# Patient Record
Sex: FEMALE | Race: WHITE | NOT HISPANIC OR LATINO | URBAN - METROPOLITAN AREA
[De-identification: names, ages, dates, MRNs, and addresses within clinical notes are randomized per-mention and may not be internally consistent; named-entity substitution may affect disease eponyms.]

---

## 2023-08-11 ENCOUNTER — INPATIENT (INPATIENT)
Facility: HOSPITAL | Age: 78
LOS: 1 days | Discharge: HOME | DRG: 87 | End: 2023-08-13
Attending: STUDENT IN AN ORGANIZED HEALTH CARE EDUCATION/TRAINING PROGRAM | Admitting: HOSPITALIST
Payer: MEDICARE

## 2023-08-11 ENCOUNTER — EMERGENCY (EMERGENCY)
Facility: HOSPITAL | Age: 78
LOS: 1 days | Discharge: ANOTHER HOSPITAL | End: 2023-08-11
Attending: EMERGENCY MEDICINE | Admitting: EMERGENCY MEDICINE
Payer: MEDICARE

## 2023-08-11 ENCOUNTER — EMERGENCY (EMERGENCY)
Facility: HOSPITAL | Age: 78
LOS: 1 days | Discharge: HOME | End: 2023-08-11
Attending: STUDENT IN AN ORGANIZED HEALTH CARE EDUCATION/TRAINING PROGRAM | Admitting: STUDENT IN AN ORGANIZED HEALTH CARE EDUCATION/TRAINING PROGRAM
Payer: MEDICARE

## 2023-08-11 VITALS
SYSTOLIC BLOOD PRESSURE: 184 MMHG | HEART RATE: 72 BPM | RESPIRATION RATE: 18 BRPM | TEMPERATURE: 98 F | DIASTOLIC BLOOD PRESSURE: 80 MMHG | OXYGEN SATURATION: 97 %

## 2023-08-11 VITALS
TEMPERATURE: 99 F | DIASTOLIC BLOOD PRESSURE: 45 MMHG | SYSTOLIC BLOOD PRESSURE: 121 MMHG | HEART RATE: 87 BPM | HEIGHT: 59 IN | RESPIRATION RATE: 16 BRPM | OXYGEN SATURATION: 95 %

## 2023-08-11 VITALS
WEIGHT: 104.94 LBS | OXYGEN SATURATION: 98 % | DIASTOLIC BLOOD PRESSURE: 84 MMHG | RESPIRATION RATE: 20 BRPM | SYSTOLIC BLOOD PRESSURE: 198 MMHG | HEART RATE: 61 BPM | HEIGHT: 59 IN | TEMPERATURE: 98 F

## 2023-08-11 VITALS
TEMPERATURE: 98 F | HEIGHT: 59 IN | WEIGHT: 104.94 LBS | DIASTOLIC BLOOD PRESSURE: 90 MMHG | RESPIRATION RATE: 20 BRPM | HEART RATE: 113 BPM | SYSTOLIC BLOOD PRESSURE: 201 MMHG | OXYGEN SATURATION: 94 %

## 2023-08-11 VITALS
RESPIRATION RATE: 18 BRPM | SYSTOLIC BLOOD PRESSURE: 149 MMHG | HEART RATE: 67 BPM | OXYGEN SATURATION: 95 % | DIASTOLIC BLOOD PRESSURE: 67 MMHG

## 2023-08-11 DIAGNOSIS — I62.00 NONTRAUMATIC SUBDURAL HEMORRHAGE, UNSPECIFIED: ICD-10-CM

## 2023-08-11 LAB
ALBUMIN SERPL ELPH-MCNC: 3.2 G/DL — LOW (ref 3.3–5)
ALBUMIN SERPL ELPH-MCNC: 3.5 G/DL — SIGNIFICANT CHANGE UP (ref 3.3–5)
ALP SERPL-CCNC: 85 U/L — SIGNIFICANT CHANGE UP (ref 40–120)
ALP SERPL-CCNC: 95 U/L — SIGNIFICANT CHANGE UP (ref 40–120)
ALT FLD-CCNC: 22 U/L — SIGNIFICANT CHANGE UP (ref 12–78)
ALT FLD-CCNC: 25 U/L — SIGNIFICANT CHANGE UP (ref 12–78)
ANION GAP SERPL CALC-SCNC: 6 MMOL/L — SIGNIFICANT CHANGE UP (ref 5–17)
ANION GAP SERPL CALC-SCNC: 8 MMOL/L — SIGNIFICANT CHANGE UP (ref 5–17)
APTT BLD: 27.1 SEC — SIGNIFICANT CHANGE UP (ref 24.5–35.6)
AST SERPL-CCNC: 27 U/L — SIGNIFICANT CHANGE UP (ref 15–37)
AST SERPL-CCNC: 33 U/L — SIGNIFICANT CHANGE UP (ref 15–37)
BASOPHILS # BLD AUTO: 0.01 K/UL — SIGNIFICANT CHANGE UP (ref 0–0.2)
BASOPHILS # BLD AUTO: 0.02 K/UL — SIGNIFICANT CHANGE UP (ref 0–0.2)
BASOPHILS NFR BLD AUTO: 0.2 % — SIGNIFICANT CHANGE UP (ref 0–2)
BASOPHILS NFR BLD AUTO: 0.4 % — SIGNIFICANT CHANGE UP (ref 0–2)
BILIRUB SERPL-MCNC: 0.3 MG/DL — SIGNIFICANT CHANGE UP (ref 0.2–1.2)
BILIRUB SERPL-MCNC: 0.4 MG/DL — SIGNIFICANT CHANGE UP (ref 0.2–1.2)
BUN SERPL-MCNC: 12 MG/DL — SIGNIFICANT CHANGE UP (ref 7–23)
BUN SERPL-MCNC: 14 MG/DL — SIGNIFICANT CHANGE UP (ref 7–23)
CALCIUM SERPL-MCNC: 8.6 MG/DL — SIGNIFICANT CHANGE UP (ref 8.5–10.1)
CALCIUM SERPL-MCNC: 9 MG/DL — SIGNIFICANT CHANGE UP (ref 8.5–10.1)
CHLORIDE SERPL-SCNC: 104 MMOL/L — SIGNIFICANT CHANGE UP (ref 96–108)
CHLORIDE SERPL-SCNC: 98 MMOL/L — SIGNIFICANT CHANGE UP (ref 96–108)
CK MB CFR SERPL CALC: 2.3 NG/ML — SIGNIFICANT CHANGE UP (ref 0–3.6)
CO2 SERPL-SCNC: 28 MMOL/L — SIGNIFICANT CHANGE UP (ref 22–31)
CO2 SERPL-SCNC: 29 MMOL/L — SIGNIFICANT CHANGE UP (ref 22–31)
CREAT SERPL-MCNC: 0.67 MG/DL — SIGNIFICANT CHANGE UP (ref 0.5–1.3)
CREAT SERPL-MCNC: 0.71 MG/DL — SIGNIFICANT CHANGE UP (ref 0.5–1.3)
EGFR: 86 ML/MIN/1.73M2 — SIGNIFICANT CHANGE UP
EGFR: 89 ML/MIN/1.73M2 — SIGNIFICANT CHANGE UP
EOSINOPHIL # BLD AUTO: 0.07 K/UL — SIGNIFICANT CHANGE UP (ref 0–0.5)
EOSINOPHIL # BLD AUTO: 0.09 K/UL — SIGNIFICANT CHANGE UP (ref 0–0.5)
EOSINOPHIL NFR BLD AUTO: 1.1 % — SIGNIFICANT CHANGE UP (ref 0–6)
EOSINOPHIL NFR BLD AUTO: 1.7 % — SIGNIFICANT CHANGE UP (ref 0–6)
GLUCOSE SERPL-MCNC: 101 MG/DL — HIGH (ref 70–99)
GLUCOSE SERPL-MCNC: 141 MG/DL — HIGH (ref 70–99)
HCT VFR BLD CALC: 44 % — SIGNIFICANT CHANGE UP (ref 34.5–45)
HCT VFR BLD CALC: 45.4 % — HIGH (ref 34.5–45)
HGB BLD-MCNC: 14.6 G/DL — SIGNIFICANT CHANGE UP (ref 11.5–15.5)
HGB BLD-MCNC: 15 G/DL — SIGNIFICANT CHANGE UP (ref 11.5–15.5)
IMM GRANULOCYTES NFR BLD AUTO: 0.2 % — SIGNIFICANT CHANGE UP (ref 0–0.9)
IMM GRANULOCYTES NFR BLD AUTO: 0.5 % — SIGNIFICANT CHANGE UP (ref 0–0.9)
INR BLD: 0.89 RATIO — SIGNIFICANT CHANGE UP (ref 0.85–1.18)
LYMPHOCYTES # BLD AUTO: 1.28 K/UL — SIGNIFICANT CHANGE UP (ref 1–3.3)
LYMPHOCYTES # BLD AUTO: 1.29 K/UL — SIGNIFICANT CHANGE UP (ref 1–3.3)
LYMPHOCYTES # BLD AUTO: 20.8 % — SIGNIFICANT CHANGE UP (ref 13–44)
LYMPHOCYTES # BLD AUTO: 25 % — SIGNIFICANT CHANGE UP (ref 13–44)
MAGNESIUM SERPL-MCNC: 1.7 MG/DL — SIGNIFICANT CHANGE UP (ref 1.6–2.6)
MCHC RBC-ENTMCNC: 31.9 PG — SIGNIFICANT CHANGE UP (ref 27–34)
MCHC RBC-ENTMCNC: 32.3 PG — SIGNIFICANT CHANGE UP (ref 27–34)
MCHC RBC-ENTMCNC: 33 GM/DL — SIGNIFICANT CHANGE UP (ref 32–36)
MCHC RBC-ENTMCNC: 33.2 GM/DL — SIGNIFICANT CHANGE UP (ref 32–36)
MCV RBC AUTO: 96.6 FL — SIGNIFICANT CHANGE UP (ref 80–100)
MCV RBC AUTO: 97.3 FL — SIGNIFICANT CHANGE UP (ref 80–100)
MONOCYTES # BLD AUTO: 0.35 K/UL — SIGNIFICANT CHANGE UP (ref 0–0.9)
MONOCYTES # BLD AUTO: 0.38 K/UL — SIGNIFICANT CHANGE UP (ref 0–0.9)
MONOCYTES NFR BLD AUTO: 6.2 % — SIGNIFICANT CHANGE UP (ref 2–14)
MONOCYTES NFR BLD AUTO: 6.8 % — SIGNIFICANT CHANGE UP (ref 2–14)
NEUTROPHILS # BLD AUTO: 3.41 K/UL — SIGNIFICANT CHANGE UP (ref 1.8–7.4)
NEUTROPHILS # BLD AUTO: 4.39 K/UL — SIGNIFICANT CHANGE UP (ref 1.8–7.4)
NEUTROPHILS NFR BLD AUTO: 65.9 % — SIGNIFICANT CHANGE UP (ref 43–77)
NEUTROPHILS NFR BLD AUTO: 71.2 % — SIGNIFICANT CHANGE UP (ref 43–77)
NRBC # BLD: 0 /100 WBCS — SIGNIFICANT CHANGE UP (ref 0–0)
NRBC # BLD: 0 /100 WBCS — SIGNIFICANT CHANGE UP (ref 0–0)
PLATELET # BLD AUTO: 290 K/UL — SIGNIFICANT CHANGE UP (ref 150–400)
PLATELET # BLD AUTO: 330 K/UL — SIGNIFICANT CHANGE UP (ref 150–400)
POTASSIUM SERPL-MCNC: 3.7 MMOL/L — SIGNIFICANT CHANGE UP (ref 3.5–5.3)
POTASSIUM SERPL-MCNC: 4 MMOL/L — SIGNIFICANT CHANGE UP (ref 3.5–5.3)
POTASSIUM SERPL-SCNC: 3.7 MMOL/L — SIGNIFICANT CHANGE UP (ref 3.5–5.3)
POTASSIUM SERPL-SCNC: 4 MMOL/L — SIGNIFICANT CHANGE UP (ref 3.5–5.3)
PROT SERPL-MCNC: 7.2 G/DL — SIGNIFICANT CHANGE UP (ref 6–8.3)
PROT SERPL-MCNC: 7.7 G/DL — SIGNIFICANT CHANGE UP (ref 6–8.3)
PROTHROM AB SERPL-ACNC: 10.5 SEC — SIGNIFICANT CHANGE UP (ref 9.5–13)
RBC # BLD: 4.52 M/UL — SIGNIFICANT CHANGE UP (ref 3.8–5.2)
RBC # BLD: 4.7 M/UL — SIGNIFICANT CHANGE UP (ref 3.8–5.2)
RBC # FLD: 11.9 % — SIGNIFICANT CHANGE UP (ref 10.3–14.5)
RBC # FLD: 11.9 % — SIGNIFICANT CHANGE UP (ref 10.3–14.5)
SODIUM SERPL-SCNC: 134 MMOL/L — LOW (ref 135–145)
SODIUM SERPL-SCNC: 139 MMOL/L — SIGNIFICANT CHANGE UP (ref 135–145)
TROPONIN I, HIGH SENSITIVITY RESULT: 5.9 NG/L — SIGNIFICANT CHANGE UP
TROPONIN I, HIGH SENSITIVITY RESULT: 6.1 NG/L — SIGNIFICANT CHANGE UP
TROPONIN I, HIGH SENSITIVITY RESULT: 6.8 NG/L — SIGNIFICANT CHANGE UP
WBC # BLD: 5.17 K/UL — SIGNIFICANT CHANGE UP (ref 3.8–10.5)
WBC # BLD: 6.16 K/UL — SIGNIFICANT CHANGE UP (ref 3.8–10.5)
WBC # FLD AUTO: 5.17 K/UL — SIGNIFICANT CHANGE UP (ref 3.8–10.5)
WBC # FLD AUTO: 6.16 K/UL — SIGNIFICANT CHANGE UP (ref 3.8–10.5)

## 2023-08-11 PROCEDURE — 93306 TTE W/DOPPLER COMPLETE: CPT | Mod: 26

## 2023-08-11 PROCEDURE — 71046 X-RAY EXAM CHEST 2 VIEWS: CPT | Mod: 26

## 2023-08-11 PROCEDURE — 99285 EMERGENCY DEPT VISIT HI MDM: CPT

## 2023-08-11 PROCEDURE — 85025 COMPLETE CBC W/AUTO DIFF WBC: CPT

## 2023-08-11 PROCEDURE — 99291 CRITICAL CARE FIRST HOUR: CPT

## 2023-08-11 PROCEDURE — 99223 1ST HOSP IP/OBS HIGH 75: CPT

## 2023-08-11 PROCEDURE — 93005 ELECTROCARDIOGRAM TRACING: CPT

## 2023-08-11 PROCEDURE — 96365 THER/PROPH/DIAG IV INF INIT: CPT | Mod: XU

## 2023-08-11 PROCEDURE — 93010 ELECTROCARDIOGRAM REPORT: CPT | Mod: 77

## 2023-08-11 PROCEDURE — 93010 ELECTROCARDIOGRAM REPORT: CPT

## 2023-08-11 PROCEDURE — 85730 THROMBOPLASTIN TIME PARTIAL: CPT

## 2023-08-11 PROCEDURE — 36415 COLL VENOUS BLD VENIPUNCTURE: CPT

## 2023-08-11 PROCEDURE — 82962 GLUCOSE BLOOD TEST: CPT

## 2023-08-11 PROCEDURE — 70450 CT HEAD/BRAIN W/O DYE: CPT | Mod: 26,59,77,MA

## 2023-08-11 PROCEDURE — 70496 CT ANGIOGRAPHY HEAD: CPT | Mod: MA

## 2023-08-11 PROCEDURE — 99285 EMERGENCY DEPT VISIT HI MDM: CPT | Mod: 25

## 2023-08-11 PROCEDURE — 84484 ASSAY OF TROPONIN QUANT: CPT

## 2023-08-11 PROCEDURE — 70450 CT HEAD/BRAIN W/O DYE: CPT | Mod: 26,MA

## 2023-08-11 PROCEDURE — 80053 COMPREHEN METABOLIC PANEL: CPT

## 2023-08-11 PROCEDURE — 96375 TX/PRO/DX INJ NEW DRUG ADDON: CPT | Mod: XU

## 2023-08-11 PROCEDURE — 70450 CT HEAD/BRAIN W/O DYE: CPT | Mod: XU,MA

## 2023-08-11 PROCEDURE — 85610 PROTHROMBIN TIME: CPT

## 2023-08-11 PROCEDURE — 82553 CREATINE MB FRACTION: CPT

## 2023-08-11 PROCEDURE — 99222 1ST HOSP IP/OBS MODERATE 55: CPT

## 2023-08-11 PROCEDURE — 70498 CT ANGIOGRAPHY NECK: CPT | Mod: MA

## 2023-08-11 PROCEDURE — 70498 CT ANGIOGRAPHY NECK: CPT | Mod: 26,MA

## 2023-08-11 PROCEDURE — 83735 ASSAY OF MAGNESIUM: CPT

## 2023-08-11 PROCEDURE — 70496 CT ANGIOGRAPHY HEAD: CPT | Mod: 26,MA

## 2023-08-11 RX ORDER — ACETAMINOPHEN 500 MG
650 TABLET ORAL ONCE
Refills: 0 | Status: COMPLETED | OUTPATIENT
Start: 2023-08-11 | End: 2023-08-11

## 2023-08-11 RX ORDER — CHLORTHALIDONE 50 MG
1 TABLET ORAL
Qty: 30 | Refills: 0
Start: 2023-08-11 | End: 2023-09-09

## 2023-08-11 RX ORDER — LEVETIRACETAM 250 MG/1
500 TABLET, FILM COATED ORAL ONCE
Refills: 0 | Status: COMPLETED | OUTPATIENT
Start: 2023-08-11 | End: 2023-08-11

## 2023-08-11 RX ORDER — LISINOPRIL 2.5 MG/1
40 TABLET ORAL DAILY
Refills: 0 | Status: DISCONTINUED | OUTPATIENT
Start: 2023-08-12 | End: 2023-08-14

## 2023-08-11 RX ORDER — LABETALOL HCL 100 MG
10 TABLET ORAL
Refills: 0 | Status: DISCONTINUED | OUTPATIENT
Start: 2023-08-11 | End: 2023-08-15

## 2023-08-11 RX ORDER — NICARDIPINE HYDROCHLORIDE 30 MG/1
5 CAPSULE, EXTENDED RELEASE ORAL
Qty: 40 | Refills: 0 | Status: DISCONTINUED | OUTPATIENT
Start: 2023-08-11 | End: 2023-08-15

## 2023-08-11 RX ORDER — LISINOPRIL 2.5 MG/1
20 TABLET ORAL DAILY
Refills: 0 | Status: DISCONTINUED | OUTPATIENT
Start: 2023-08-11 | End: 2023-08-11

## 2023-08-11 RX ORDER — LISINOPRIL 2.5 MG/1
1 TABLET ORAL
Qty: 30 | Refills: 0
Start: 2023-08-11 | End: 2023-09-09

## 2023-08-11 RX ADMIN — Medication 650 MILLIGRAM(S): at 20:47

## 2023-08-11 RX ADMIN — LISINOPRIL 20 MILLIGRAM(S): 2.5 TABLET ORAL at 12:30

## 2023-08-11 RX ADMIN — LEVETIRACETAM 500 MILLIGRAM(S): 250 TABLET, FILM COATED ORAL at 20:31

## 2023-08-11 RX ADMIN — NICARDIPINE HYDROCHLORIDE 25 MG/HR: 30 CAPSULE, EXTENDED RELEASE ORAL at 20:47

## 2023-08-11 RX ADMIN — LEVETIRACETAM 420 MILLIGRAM(S): 250 TABLET, FILM COATED ORAL at 20:15

## 2023-08-11 NOTE — ED ADULT NURSE NOTE - CHIEF COMPLAINT QUOTE
transfer from [facility] on cardene 2.5mg/min, Alert and oriented to person, place, and time, given keppra in route, given 650mg po tylenol prior to leaving, was syncope and collapse

## 2023-08-11 NOTE — CONSULT NOTE ADULT - SUBJECTIVE AND OBJECTIVE BOX
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 Pt was seen earlier today at [facility] hospital for elevated BP and her BP medications were adjusted. Per family, they then went to the pharmacy picked up the medication and then went home. The patient was then talking on the phone with a friend when she became unresponsive and her daughter heard a loud thud and found the patient on the ground. Patient had LOC for about 30 seconds per family. Pt is amnesic to events immediately before and following this incident. Reports mild headache but denies any dizziness, vision changes, N/V. Was transferred to [facility] for further neurosurgical eval.     PAST MEDICAL & SURGICAL HISTORY:  HTN (hypertension)    HLD      FAMILY HISTORY:      SOCIAL HISTORY:  Tobacco Use: Denies  EtOH use: 1 glass of wine a day  Substance: Denies    Allergies    No Known Allergies    Intolerances        REVIEW OF SYSTEMS  Negative except as noted in HPI      HOME MEDICATIONS:  Home Medications:  Atorvastatin  Lisinopril    MEDICATIONS:  Antibiotics:    Neuro:    Anticoagulation:    OTHER:    IVF:      Vital Signs Last 24 Hrs  T(C): 37.3 (11 Aug 2023 21:30), Max: 37.3 (11 Aug 2023 21:30)  T(F): 99.1 (11 Aug 2023 21:30), Max: 99.1 (11 Aug 2023 21:30)  HR: 79 (11 Aug 2023 21:41) (61 - 113)  BP: 151/66 (11 Aug 2023 21:41) (121/45 - 201/90)  BP(mean): 95 (11 Aug 2023 21:41) (95 - 95)  RR: 16 (11 Aug 2023 21:41) (16 - 20)  SpO2: 96% (11 Aug 2023 21:41) (94% - 98%)    Parameters below as of 11 Aug 2023 21:41  Patient On (Oxygen Delivery Method): room air          PHYSICAL EXAM:  GENERAL: NAD, well-groomed  HEAD:  Atraumatic, normocephalic  EYES: Conjunctiva and sclera clear  ENMT: Good dentition  NECK: Supple, nontender to palpation  BLANCA COMA SCORE: E- V- M- = 15       E: 4= opens eyes spontaneously 3= to voice 2= to noxious 1= no opening       V: 5= oriented 4= confused 3= inappropriate words 2= incomprehensible sounds 1= nonverbal 1T= intubated       M: 6= follows commands 5= localizes 4= withdraws 3= flexor posturing 2= extensor posturing 1= no movement  MENTAL STATUS: AAO x3; Awake; Opens eyes spontaneously; Appropriately conversant without aphasia; following simple commands; amnesic to events before and immediately after syncope  CRANIAL NERVES: Visual acuity normal for age, visual fields full to confrontation, PERRL. EOMI without nystagmus. Facial sensation intact V1-3 distribution b/l. Face symmetric w/ normal eye closure and smile, tongue does deviate to L (pt unsure if this is baseline). Hearing grossly intact. Speech clear. Head turning and shoulder shrug intact.   REFLEXES: PERRL.   MOTOR: strength 5/5 b/l upper and lower extremities  SENSATION: grossly intact to light touch all extremities  COORDINATION: Gait testing deferred; no upper extremity dysmetria  CHEST/LUNG: Nonlabored on room air  SKIN: Warm, dry    LABS:                        15.0   6.16  )-----------( 330      ( 11 Aug 2023 16:45 )             45.4     08-11    134<L>  |  98  |  12  ----------------------------<  141<H>  3.7   |  28  |  0.71    Ca    8.6      11 Aug 2023 16:45  Mg     1.7     08-11    TPro  7.7  /  Alb  3.5  /  TBili  0.3  /  DBili  x   /  AST  33  /  ALT  25  /  AlkPhos  95  08-11    PT/INR - ( 11 Aug 2023 16:45 )   PT: 10.5 sec;   INR: 0.89 ratio         PTT - ( 11 Aug 2023 16:45 )  PTT:27.1 sec  Urinalysis Basic - ( 11 Aug 2023 16:45 )    Color: x / Appearance: x / SG: x / pH: x  Gluc: 141 mg/dL / Ketone: x  / Bili: x / Urobili: x   Blood: x / Protein: x / Nitrite: x   Leuk Esterase: x / RBC: x / WBC x   Sq Epi: x / Non Sq Epi: x / Bacteria: x        RADIOLOGY & ADDITIONAL STUDIES:    ACC: [id number] EXAM: CT ANGIO BRAIN (W)  ACC: [id number] EXAM: CT ANGIO NECK (W)  ACC: [id number] EXAM: CT BRAIN     PROCEDURE DATE: 08/11/2023    IMPRESSION:    CT HEAD:  1. Thin subdural hematoma overlying the right temporal lobe measuring upwards of 0.4 cm in thickness.  2. Small areas of decreased attenuation in the deep and periventricular white matter, likely compatible with chronic small vessel disease.    CTA BRAIN:  2 to 3 mm outpouching oriented inferiorly from the supraclinoid segment of the left ICA. 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    CTA NECK:  1. No evidence of critical stenosis by NASCET criteria.  2. Significant degenerative change of the cervical spine resulting in narrowing of the cervical spinal canal. Recommend MRI of the cervical spine for further assessment.     HISTORY OF PRESENT ILLNESS:   77yF PMHx of HTN, HLD, and monoclonal protein that could be a precursor from multiple myeloma monitored by hematologist for a monoclonal protein presents as transfer from [facility] s/p unwitnessed syncopal fall w/ CTH showing small R temporal SDH and CTA head showing incidental 2 to 3 mm outpouching of L ICA. Pt was seen earlier today at [facility] hospital for elevated BP and her BP medications were adjusted. Per family, they then went to the pharmacy picked up the medication and then went home. The patient was then talking on the phone with a friend when she became unresponsive and her daughter heard a loud thud and found the patient on the ground. Patient had LOC for about 30 seconds per family. Pt is amnesic to events immediately before and following this incident. Reports mild headache but denies any dizziness, vision changes, N/V. Denies any AC/AP use. Was transferred to [facility] for further neurosurgical eval.     PAST MEDICAL & SURGICAL HISTORY:  HTN (hypertension)    HLD      FAMILY HISTORY:      SOCIAL HISTORY:  Tobacco Use: Denies  EtOH use: 1 glass of wine a day  Substance: Denies    Allergies    No Known Allergies    Intolerances        REVIEW OF SYSTEMS  Negative except as noted in HPI      HOME MEDICATIONS:  Home Medications:  Atorvastatin  Lisinopril    MEDICATIONS:  Antibiotics:    Neuro:    Anticoagulation:    OTHER:    IVF:      Vital Signs Last 24 Hrs  T(C): 37.3 (11 Aug 2023 21:30), Max: 37.3 (11 Aug 2023 21:30)  T(F): 99.1 (11 Aug 2023 21:30), Max: 99.1 (11 Aug 2023 21:30)  HR: 79 (11 Aug 2023 21:41) (61 - 113)  BP: 151/66 (11 Aug 2023 21:41) (121/45 - 201/90)  BP(mean): 95 (11 Aug 2023 21:41) (95 - 95)  RR: 16 (11 Aug 2023 21:41) (16 - 20)  SpO2: 96% (11 Aug 2023 21:41) (94% - 98%)    Parameters below as of 11 Aug 2023 21:41  Patient On (Oxygen Delivery Method): room air          PHYSICAL EXAM:  GENERAL: NAD, well-groomed  HEAD:  Atraumatic, normocephalic  EYES: Conjunctiva and sclera clear  ENMT: Good dentition  NECK: Supple, nontender to palpation  BLANCA COMA SCORE: E- V- M- = 15       E: 4= opens eyes spontaneously 3= to voice 2= to noxious 1= no opening       V: 5= oriented 4= confused 3= inappropriate words 2= incomprehensible sounds 1= nonverbal 1T= intubated       M: 6= follows commands 5= localizes 4= withdraws 3= flexor posturing 2= extensor posturing 1= no movement  MENTAL STATUS: AAO x3; Awake; Opens eyes spontaneously; Appropriately conversant without aphasia; following simple commands; amnesic to events before and immediately after syncope  CRANIAL NERVES: Visual acuity normal for age, visual fields full to confrontation, PERRL. EOMI without nystagmus. Facial sensation intact V1-3 distribution b/l. Face symmetric w/ normal eye closure and smile, tongue does deviate to L (pt unsure if this is baseline). Hearing grossly intact. Speech clear. Head turning and shoulder shrug intact.   REFLEXES: PERRL.   MOTOR: strength 5/5 b/l upper and lower extremities  SENSATION: grossly intact to light touch all extremities  COORDINATION: Gait testing deferred; no upper extremity dysmetria  CHEST/LUNG: Nonlabored on room air  SKIN: Warm, dry    LABS:                        15.0   6.16  )-----------( 330      ( 11 Aug 2023 16:45 )             45.4     08-11    134<L>  |  98  |  12  ----------------------------<  141<H>  3.7   |  28  |  0.71    Ca    8.6      11 Aug 2023 16:45  Mg     1.7     08-11    TPro  7.7  /  Alb  3.5  /  TBili  0.3  /  DBili  x   /  AST  33  /  ALT  25  /  AlkPhos  95  08-11    PT/INR - ( 11 Aug 2023 16:45 )   PT: 10.5 sec;   INR: 0.89 ratio         PTT - ( 11 Aug 2023 16:45 )  PTT:27.1 sec  Urinalysis Basic - ( 11 Aug 2023 16:45 )    Color: x / Appearance: x / SG: x / pH: x  Gluc: 141 mg/dL / Ketone: x  / Bili: x / Urobili: x   Blood: x / Protein: x / Nitrite: x   Leuk Esterase: x / RBC: x / WBC x   Sq Epi: x / Non Sq Epi: x / Bacteria: x        RADIOLOGY & ADDITIONAL STUDIES:    ACC: [id number] EXAM: CT ANGIO BRAIN (W)  ACC: [id number] EXAM: CT ANGIO NECK (W)  ACC: [id number] EXAM: CT BRAIN     PROCEDURE DATE: 08/11/2023    IMPRESSION:    CT HEAD:  1. Thin subdural hematoma overlying the right temporal lobe measuring upwards of 0.4 cm in thickness.  2. Small areas of decreased attenuation in the deep and periventricular white matter, likely compatible with chronic small vessel disease.    CTA BRAIN:  2 to 3 mm outpouching oriented inferiorly from the supraclinoid segment of the left ICA. 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    CTA NECK:  1. No evidence of critical stenosis by NASCET criteria.  2. Significant degenerative change of the cervical spine resulting in narrowing of the cervical spinal canal. Recommend MRI of the cervical spine for further assessment.

## 2023-08-11 NOTE — ED ADULT NURSE NOTE - NSFALLUNIVINTERV_ED_ALL_ED
Bed/Stretcher in lowest position, wheels locked, appropriate side rails in place/Call bell, personal items and telephone in reach/Instruct patient to call for assistance before getting out of bed/chair/stretcher/Non-slip footwear applied when patient is off stretcher/[place] to call system/Physically safe environment - no spills, clutter or unnecessary equipment/Purposeful proactive rounding/Room/bathroom lighting operational, light cord in reach

## 2023-08-11 NOTE — ED ADULT NURSE NOTE - OBJECTIVE STATEMENT
pt tx from [facility] s/p syncopal episode. pmh HTN, HLD. pt states she woke up this morning and checked her BP prior to taking her medications and noted her BP was 200's. pt states she went to [facility], was prescribed lisinopril and diuretic and then was dc home. pt states she went home and was talking on the phone and had a unwitnessed syncopal episode. daughter found pt lying on the floor. pt denies any anticoagulants. pt  then took pt back to [facility] where she had a CT which demonstrated a ICH. pt received in CC area of ED, A+O x4. RR even and unlabored. NSR on CM. cardene infusing at 2.5mg/min. pt denies vision changes, N/V, dizziness. pt reports 6/10 headache, described as heaviness.

## 2023-08-11 NOTE — ED PROVIDER NOTE - CARE PROVIDER_API CALL
Tommy Smallwood  Cardiovascular Disease  [address] [place] [address]  Phone: [phone]  Fax: [phone]  Follow Up Time: 7-10 Days

## 2023-08-11 NOTE — H&P ADULT - NSHPPHYSICALEXAM_GEN_ALL_CORE
T(C): 36.8 (08-11-23 @ 23:58), Max: 37.3 (08-11-23 @ 21:30)  HR: 69 (08-11-23 @ 23:58) (61 - 113)  BP: 123/68 (08-11-23 @ 23:58) (121/45 - 201/90)  RR: 20 (08-11-23 @ 23:58) (16 - 21)  SpO2: 96% (08-11-23 @ 23:58) (94% - 98%)    GENERAL: patient appears well, no acute distress, appropriate, pleasant  EYES: sclera clear, no exudates  ENMT: oropharynx clear without erythema, no exudates, moist mucous membranes  NECK: supple, soft, no thyromegaly noted  LUNGS: good air entry bilaterally, clear to auscultation, symmetric breath sounds, no wheezing or rhonchi appreciated  HEART: soft S1/S2, regular rate and rhythm, no murmurs noted, no lower extremity edema  GASTROINTESTINAL: abdomen is soft, nontender, nondistended, normoactive bowel sounds, no palpable masses  INTEGUMENT: good skin turgor, warm skin, appears well perfused  MUSCULOSKELETAL: no clubbing or cyanosis, no obvious deformity  NEUROLOGIC: awake, alert, oriented x3, good muscle tone in 4 extremities, no obvious sensory deficits  PSYCHIATRIC: mood is good, affect is congruent, linear and logical thought process  HEME/LYMPH: no palpable supraclavicular nodules, no obvious ecchymosis or petechiae

## 2023-08-11 NOTE — ED ADULT NURSE NOTE - OBJECTIVE STATEMENT
Pt states that she took her BP at home today and the SBP was over 200. Pt noted noted to have a manual BP of 175/75. Pt was placed on cardiac monitor.  Pt states she developed a left sided discomfort today. Pt denies N,V, SOB, Blurred vision, double vision. Pt in no acute distress. Pt updated on plan of care.

## 2023-08-11 NOTE — H&P ADULT - ASSESSMENT
78 yo F PMHx HTN, HLD and monoclonal protein that could be precursor from multiple myeloma (monitored by hematology) who presented for syncope resulting in an unwitnessed fall.     #subdural hematoma  #syncope  #unwitnessed fall  #headache  -Repeat CT in process  -neurocheck q4h  -tylenol for pain  -order ECHO    #HTN  -hold home lisinopril and Chlorthalidone  -can restart if systolic BP > 160    #depression/anxiety  c/w escitalopram     DVT ppx: SCDs   76 yo F PMHx HTN, HLD and monoclonal protein that could be precursor from multiple myeloma (monitored by hematology) who presented for syncope resulting in an unwitnessed fall.     #subdural hematoma  #syncope  #unwitnessed fall  #headache  -Repeat CT showing increase in subdural hematoma  -neurochecks q2h (given increase in subdural hematoma)  -tylenol for pain  -follow up ECHO  -orthostats   -pt passed bedside swallow, diet ordered  -fall precautions  -telemetry monitoring      #HTN  restart lisinopril and hold chlorthalidone  goal BP systolic <160    #depression/anxiety  c/w escitalopram     DVT ppx: SCDs   76 yo F PMHx HTN, HLD and monoclonal protein that could be precursor from multiple myeloma (monitored by hematology) who presented for syncope resulting in an unwitnessed fall. Imaging showing subdural hematoma.     #subdural hematoma  #syncope  #unwitnessed fall  #headache  -Repeat CT showing increase in subdural hematoma  -neurosurgery consulted, recs appreciated  -neurochecks q2h (given increase in subdural hematoma size)  -tylenol for pain  -follow up ECHO  -orthostats   -pt passed bedside swallow, diet ordered  -fall precautions  -telemetry monitoring    #incidental 2-3 mm outpouching of L ICA  monitor in outpatient setting      #HTN  restart lisinopril and hold chlorthalidone  goal BP systolic <160    #depression/anxiety  c/w escitalopram     DVT ppx: SCDs

## 2023-08-11 NOTE — ED PROVIDER NOTE - OBJECTIVE STATEMENT
77-year-old female with a history of hypertension on lisinopril 20mg BID presents with hypertension.  Patient states recently her blood pressures have been going up and has been in the 180s range.  Patient saw her PCP 4 days ago Dr. Valdovinos and 12.5 mg of hydrochlorothiazide was added to her medication regimen.  Patient has been taking it but woke up this morning and her blood pressure was 205 systolic (prior to taking her medications ) and she was alarmed so she came to the ER.  Patient states she has also been having intermittent chest pain and left upper back pain which she mentioned to her dr, had EKG done that her doctor said was normal.  Patient was also told to follow-up with a cardiologist but has not made an appointment yet.  Patient states she does get Headaches at times at night and has intermittent chest pain as well has the episode today.  Patient was concerned because strokes run in her family and was concerned that if her blood pressure was high she would get a stroke.

## 2023-08-11 NOTE — ED PROVIDER NOTE - OBJECTIVE STATEMENT
The patient is a 77 year old female presents with syncope causing a small subdural bleed transferred from [facility]  The patient had a episode of HTN and went to [place] and was discharged home but the patient had syncopal episode  No CP, No SOB, No abd pain, No motor No sensory loss

## 2023-08-11 NOTE — ED PROVIDER NOTE - CLINICAL SUMMARY MEDICAL DECISION MAKING FREE TEXT BOX
77-year-old female with history of hypertension, was here and earlier today in the ER for elevated blood pressure, went home and now syncopized and does not recall any of the events, patient currently awake and alert offers no complaints no headache, no chest pain, no shortness of breath, will repeat EKG, send repeat labs, follow-up CT head and CT angio of head and neck, placed on cardiac monitor and admit. 77-year-old female with history of hypertension, was here and earlier today in the ER for elevated blood pressure, went home and now syncopized and does not recall any of the events, patient currently awake and alert offers no complaints no headache, no chest pain, no shortness of breath, will repeat EKG, send repeat labs, follow-up CT head and CT angio of head and neck, placed on cardiac monitor and admit.  -CT CTA with subdural and questionable saccular aneurysm.  Patient in ER nonfocal except for some amnesia of earlier part of the day but with hypertension.  Discussed with neurosurgery at transfer center for [place]: Treating the patient with Cardene drip Keppra and transfer tier 1 to [place] ER.

## 2023-08-11 NOTE — ED ADULT NURSE NOTE - PRIMARY CARE PROVIDER
PATIENT HAS BEEN CALLED, GIVEN MESSAGE AND STATED THAT SHE WOULD BE GOING TO THE ER.     PATIENT STATED THAT THE ER AT [facility] DID PRESCRIBE ANTIBIOTICS.   Dr Alex Valdovinos

## 2023-08-11 NOTE — ED PROVIDER NOTE - CARE PLAN
1 Principal Discharge DX:	Syncope   Principal Discharge DX:	Syncope  Secondary Diagnosis:	Subdural hemorrhage

## 2023-08-11 NOTE — ED PROVIDER NOTE - PATIENT PORTAL LINK FT
You can access the FollowMyHealth Patient Portal offered by [facility] by registering at the following website: [email or web address]. By joining [employer]’s FollowMyHealth portal, you will also be able to view your health information using other applications (apps) compatible with our system.

## 2023-08-11 NOTE — ED PROVIDER NOTE - DIFFERENTIAL DIAGNOSIS
Hypertensive urgency, vasovagal syncope, transient global amnesia Differential Diagnosis Hypertensive urgency, vasovagal syncope, transient global amnesia--

## 2023-08-11 NOTE — ED ADULT TRIAGE NOTE - CHIEF COMPLAINT QUOTE
my pressure is gradually getting higher and higher.  I take lisinopril daily.  I have had this" funny feeling" on my left side chest went to my doctor (Alex Valdovinos MD) on Monday, he did an ekg, and started me on HCTZ, but my pressure is thru the roof.  Stroke runs in my family and I am concerned.  I have also had a headache on / off.  Taken to T2 for EKG.  Good strength upper and lower extremities.  A/O x4.

## 2023-08-11 NOTE — CONSULT NOTE ADULT - SUBJECTIVE AND OBJECTIVE BOX
[facility]    Jayjay Smallwood, Jus, Russ, Enzo, Dell, Hammad       [phone] (office)    Reason for Consult: HTN    HPI: 78 yo female with PMHx of HTN presents to the ED for systolic BP of 204 at home this morning. Pt was noted to have a BP of Systolic 160s when she went to her hematologist office in May. Was told to monitor her BP. Pt states that her BP at home has been vacillating between 120s -160s. Saw her PCP 8/7/23 fo routine physical and noted to have BP of 160s systolic. Pt started on HCTZ 12.5 mg in addition to her standing lisinopril 20mg. Pt elicits associated left sided achy-ness for the last 2 nights that seems to come and go. No associated numbness, paresthesias, or weakness. Pt elicits headache. Denies dizziness, blurry vision, SOB, chest pain, chest pressure, palpitations, N/V/D.  Pt was told to follow with cardiologist however has yet to establish care       PAST MEDICAL & SURGICAL HISTORY:  HTN (hypertension)          SOCIAL HISTORY: No active tobacco, alcohol or illicit drug use    FAMILY HISTORY:  Grandfather: CVA      Home Medications:  Trazadone 50mg qd  Atorvastatin 10mg qd   Dorzolomide-timolol eye drops   lisinopril 20mg qd   HCTZ 12.5 qd   escitalopram 20mg qd     MEDICATIONS  (STANDING):    MEDICATIONS  (PRN):      Allergies    No Known Allergies    Intolerances        REVIEW OF SYSTEMS: Negative except as per HPI.    VITAL SIGNS:   Vital Signs Last 24 Hrs  T(C): 36.8 (11 Aug 2023 08:30), Max: 36.8 (11 Aug 2023 08:30)  T(F): 98.2 (11 Aug 2023 08:30), Max: 98.2 (11 Aug 2023 08:30)  HR: 113 (11 Aug 2023 08:30) (113 - 113)  BP: 175/75 (11 Aug 2023 09:23) (175/75 - 201/90)  BP(mean): --  RR: 20 (11 Aug 2023 08:30) (20 - 20)  SpO2: 94% (11 Aug 2023 08:30) (94% - 94%)    Parameters below as of 11 Aug 2023 08:30  Patient On (Oxygen Delivery Method): room air        I&O's Summary      PHYSICAL EXAM:  Constitutional: NAD, well-developed  HEENT NC/AT, moist mucous membranes  Pulmonary: Non-labored, breath sounds are clear bilaterally, no wheezing, rales or rhonchi  Cardiovascular: +S1, S2, RRR, no murmur  Gastrointestinal: Soft, nontender, nondistended, normoactive bowel sounds  Extremities: No peripheral edema   Neurological: Alert, strength and sensitivity are grossly intact  Skin: No obvious lesions/rashes  Psych: Mood & affect appropriate    LABS: All Labs Reviewed:                        14.6   5.17  )-----------( 290      ( 11 Aug 2023 09:18 )             44.0     11 Aug 2023 09:18    139    |  104    |  14     ----------------------------<  101    4.0     |  29     |  0.67     Ca    9.0        11 Aug 2023 09:18    TPro  7.2    /  Alb  3.2    /  TBili  0.4    /  DBili  x      /  AST  27     /  ALT  22     /  AlkPhos  85     11 Aug 2023 09:18          Blood Culture:         EKG: NSR 61 bpm, nonspecific t wave inversions    RADIOLOGY:  < from: CT Head No Cont (08.11.23 @ 09:28) >    ACC: [id number] EXAM:  CT BRAIN   ORDERED BY: DORY GANDHI     PROCEDURE DATE:  08/11/2023          INTERPRETATION:  CLINICAL STATEMENT: Pain.    TECHNIQUE: CT of the head was performed without IV contrast.    COMPARISON: None.    FINDINGS:  There is mild diffuse parenchymal volume loss.    There are areas of low attenuation in the periventricular white matter   likely related to mild chronic microvascular ischemic changes.    Nonspecific small calcifications posterior left frontal lobe noted    There is no acute intracranial hemorrhage, mass effect or midline shift.   There is no acute territorial infarct. There is no hydrocephalus.    The cranium is intact. Mucosal thickening right maxillary sinus        IMPRESSION:  No acute intracranial hemorrhage or acute territorial infarct.  If   symptoms persist, follow-up MRI exam recommended.    --- End of Report ---            REUBEN DIEGO MD; Attending Radiologist  This document has been electronically signed. Aug 11 2023  9:37AM    < end of copied text >     [facility]    Jayjay Smallwood, Jus, Russ, Enzo, Dell, Hammad       [phone] (office)    Reason for Consult: HTN    HPI: 76 yo female with PMHx of HTN presents to the ED for systolic BP of 204 at home this morning. Pt was noted to have a BP of Systolic 160s when she went to her hematologist office in May. Was told to monitor her BP. Pt states that her BP at home has been vacillating between 120s -160s. Saw her PCP 8/7/23 fo routine physical and noted to have BP of 160s systolic. Pt started on HCTZ 12.5 mg in addition to her standing lisinopril 20mg. Pt elicits associated left sided achy-ness for the last 2 nights that seems to come and go. No associated numbness, paresthesias, or weakness. Pt elicits headache. Denies dizziness, blurry vision, SOB, chest pain, chest pressure, palpitations, N/V/D.  Pt was told to follow with cardiologist however has yet to establish care       PAST MEDICAL & SURGICAL HISTORY:  HTN (hypertension)          SOCIAL HISTORY: No active tobacco, alcohol or illicit drug use    FAMILY HISTORY:  Grandfather: CVA      Home Medications:  Trazadone 50mg qd  Atorvastatin 10mg qd   Dorzolomide-timolol eye drops   lisinopril 20mg qd   HCTZ 12.5 qd   escitalopram 20mg qd     MEDICATIONS  (STANDING):    MEDICATIONS  (PRN):      Allergies    No Known Allergies    Intolerances        REVIEW OF SYSTEMS: Negative except as per HPI.    VITAL SIGNS:   Vital Signs Last 24 Hrs  T(C): 36.8 (11 Aug 2023 08:30), Max: 36.8 (11 Aug 2023 08:30)  T(F): 98.2 (11 Aug 2023 08:30), Max: 98.2 (11 Aug 2023 08:30)  HR: 113 (11 Aug 2023 08:30) (113 - 113)  BP: 175/75 (11 Aug 2023 09:23) (175/75 - 201/90)  BP(mean): --  RR: 20 (11 Aug 2023 08:30) (20 - 20)  SpO2: 94% (11 Aug 2023 08:30) (94% - 94%)    Parameters below as of 11 Aug 2023 08:30  Patient On (Oxygen Delivery Method): room air        I&O's Summary      PHYSICAL EXAM:  Constitutional: NAD, well-developed  HEENT NC/AT, moist mucous membranes  Pulmonary: Non-labored, breath sounds are clear bilaterally, no wheezing, rales or rhonchi  Cardiovascular: +S1, S2, RRR, no murmur  Gastrointestinal: Soft, nontender, nondistended, normoactive bowel sounds  Extremities: No peripheral edema   Neurological: Alert, strength and sensitivity are grossly intact  Skin: No obvious lesions/rashes  Psych: Mood & affect appropriate    LABS: All Labs Reviewed:                        14.6   5.17  )-----------( 290      ( 11 Aug 2023 09:18 )             44.0     11 Aug 2023 09:18    139    |  104    |  14     ----------------------------<  101    4.0     |  29     |  0.67     Ca    9.0        11 Aug 2023 09:18    TPro  7.2    /  Alb  3.2    /  TBili  0.4    /  DBili  x      /  AST  27     /  ALT  22     /  AlkPhos  85     11 Aug 2023 09:18          Blood Culture:         EKG: NSR 61 bpm, nonspecific t wave inversions    RADIOLOGY:  < from: CT Head No Cont (08.11.23 @ 09:28) >    ACC: [id number] EXAM:  CT BRAIN   ORDERED BY: DORY GANDHI     PROCEDURE DATE:  08/11/2023          INTERPRETATION:  CLINICAL STATEMENT: Pain.    TECHNIQUE: CT of the head was performed without IV contrast.    COMPARISON: None.    FINDINGS:  There is mild diffuse parenchymal volume loss.    There are areas of low attenuation in the periventricular white matter   likely related to mild chronic microvascular ischemic changes.    Nonspecific small calcifications posterior left frontal lobe noted    There is no acute intracranial hemorrhage, mass effect or midline shift.   There is no acute territorial infarct. There is no hydrocephalus.    The cranium is intact. Mucosal thickening right maxillary sinus        IMPRESSION:  No acute intracranial hemorrhage or acute territorial infarct.  If   symptoms persist, follow-up MRI exam recommended.    --- End of Report ---            REUBEN DIEGO MD; Attending Radiologist  This document has been electronically signed. Aug 11 2023  9:37AM    < end of copied text >

## 2023-08-11 NOTE — CONSULT NOTE ADULT - ASSESSMENT
76 yo female with PMHx of HTN presents to the ED for systolic BP of 204 at home this morning. Cardiology consulted for hypertensive urgency.    Hypertensive Urgency   - Pt with hx of HTN; complaining of left sided ache   - On Lisinopril 20mg qd and HCTZ 12.5 mg qd at home   - BP this AM at home 204 systolic; BP on admission 201/90 --> 175/75  - Pt states that she took both of her home meds this AM   - trop neg x1; repeat 2nd set   - EKG shows NSR 61 bpm, nonspecific t wave inversions    - No meaningful evidence of volume overload.    - Monitor and replete lytes, keep K>4, Mg>2.  - Strict I/Os, daily weights.  - Other cardiovascular workup will depend on clinical course.  - All other workup per primary team.               76 yo female with PMHx of HTN presents to the ED for systolic BP of 204 at home this morning. Cardiology consulted for hypertensive urgency.    Hypertensive Urgency   - Pt with hx of HTN; complaining of left sided ache   - On Lisinopril 20mg qd and HCTZ 12.5 mg qd at home   - BP this AM at home 204 systolic; BP on admission 201/90 --> 175/75  - Pt states that she took both of her home meds this AM   - trop neg x1; repeat 2nd set   - EKG shows NSR 61 bpm, repeat ekg for possible lead placement issues   - STAT lisinopril 20mg PO x1, HCTZ 12.5mg PO x1   - Dc pt on lisinopril 40mg qd, Chlorthalidone 25mg qd   - Pt to f/u outpt for stress test and echo    - No meaningful evidence of volume overload.    - Monitor and replete lytes, keep K>4, Mg>2.  - Strict I/Os, daily weights.  - Other cardiovascular workup will depend on clinical course.  - All other workup per primary team.               76 yo female with PMHx of HTN presents to the ED for systolic BP of 204 at home this morning. Cardiology consulted for hypertensive urgency.    Hypertensive Urgency   - Pt with hx of HTN; complaining of left sided ache   - On Lisinopril 20mg qd and HCTZ 12.5 mg qd at home   - BP this AM at home 204 systolic; BP on admission 201/90 --> 175/75  - Pt states that she took both of her home meds this AM   - trop neg x1; repeat 2nd set   - EKG shows NSR 61 bpm, repeat ekg for possible lead placement issues   - STAT lisinopril 20mg PO x1, HCTZ 12.5mg PO x1   - Dc pt on lisinopril 40mg qd, Chlorthalidone 25mg qd   - Pt to f/u outpt for stress test and echo    - No meaningful evidence of volume overload.    - Monitor and replete lytes, keep K>4, Mg>2.  - Strict I/Os, daily weights.  - Other cardiovascular workup will depend on clinical course.  - All other workup per primary team.      ADDENDUM    HPI: Pt presents to hospital after episode of syncope at home. Hx elicited from  and daughter at bedside.  states that they left the hospital and then went to [facility] to  their new medications. Pt then went home and ate half a muffin. Daughter states that she was in the basement when she heard a thud in the living room. Ran upstairs and found her mother on her back in the living room with her eyes rolled back. Daughter states that within 30 seconds pt became arousable but greatly confused. Pt was on the phone with her friend Hallie at the time of the syncope. Pt has no recollection of any of the events surrounding the incident. Pt does not remember coming to the hospital this AM. As conversation progresses, pt with slight recollection of small details.     PHYSICAL EXAM:  Constitutional: NAD, well-developed, confused   HEENT NC/AT, moist mucous membranes  Pulmonary: Non-labored, breath sounds are clear bilaterally, no wheezing, rales or rhonchi  Cardiovascular: +S1, S2, RRR, no murmur  Gastrointestinal: Soft, nontender, nondistended, normoactive bowel sounds  Extremities: No peripheral edema   Neurological: Alert, strength and sensitivity are grossly intact  Skin: No obvious lesions/rashes  Psych: Mood & affect appropriate      76 yo female with PMHx of HTN presented to the ED this AM for systolic BP of 204 at home this morning. Cardiology consulted this AM for hypertensive urgency. Pt now presents for syncopal episode at home with associated amnesia.     PLAN     HTN   - continue lisinopril 40 mg qd and HCTZ 25 mg qd; HOLD for SBP <140, HR <60   - Monitor hemodynamics   - EKG wnl; NSR   - Order echo     - Monitor and replete lytes, keep K>4, Mg>2.  - Strict I/Os, daily weights.  - Other cardiovascular workup will depend on clinical course.  - All other workup per primary team.

## 2023-08-11 NOTE — ED PROVIDER NOTE - OBJECTIVE STATEMENT
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  Patient was here in the emergency department earlier today for elevated blood pressure when she was discharged home with lisinopril and chlorthalidone.  Patient does not have any recollection of passing out and does not remember being here earlier in the ER.   and daughter at the bedside, states that the patient was having a conversation on the phone with her friend and heard her crash to the floor.

## 2023-08-11 NOTE — ED ADULT NURSE NOTE - NSFALLUNIVINTERV_ED_ALL_ED
Bed/Stretcher in lowest position, wheels locked, appropriate side rails in place/Call bell, personal items and telephone in reach/Instruct patient to call for assistance before getting out of bed/chair/stretcher/Non-slip footwear applied when patient is off stretcher/[place] to call system/Physically safe environment - no spills, clutter or unnecessary equipment/Purposeful proactive rounding/Room/bathroom lighting operational, light cord in reach

## 2023-08-11 NOTE — H&P ADULT - HISTORY OF PRESENT ILLNESS
78 yo F PMHx HTN who presented for syncope resulting in an unwitnessed fall. Per patient and daughter earlier this morning pt was experiencing an episode of uncontrolled HLD, HTN (reports a home systolic reading of over 200), so she went to [place] for treatment of hypertensive urgency. Pt was given an additional dose of her home dose of lisinopril 20 mg PO and HCTZ 12.5 mg once. And was dishcarged with lispinpril 40 mg daily and chlorthalidone 25 mg daily. Pt arrived home and had an unwitnessed fall in her bedroom. Pt states that she suddenly lost conciousness. Her daughter heard a thud and found her mom on the floor unconscious, was unconscious for about 30 seconds. She was arousable but confused. Pt was taken back to [place] where head CT showed a R temporal subdural hematoma; CTA head showed an incidental 2-3 mm outpouching of L ICA. Currently, pt endorses right sided headache, N/V, no dizziness, no SOB. Pt was transferred for for neurosurgical evaluation.     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 Per patient and daughter earlier this morning pt was experiencing an episode of uncontrolled HLD, HTN (reports a home systolic reading of over 200), so she went to [place] for treatment of hypertensive urgency. Pt was given an additional dose of her home dose of lisinopril 20 mg PO and HCTZ 12.5 mg once. And was dishcarged with lispinpril 40 mg daily and chlorthalidone 25 mg daily. Pt arrived home and had an unwitnessed fall in her bedroom. Pt states that she suddenly lost conciousness. Her daughter heard a thud and found her mom on the floor unconscious, was unconscious for about 30 seconds. She was arousable but confused. Pt was taken back to [place] where head CT showed a R temporal subdural hematoma; CTA head showed an incidental 2-3 mm outpouching of L ICA. Currently, pt endorses right sided headache, N/V, no dizziness, no SOB. Pt was transferred for for neurosurgical evaluation.     In the ED, /78, labs unremarkable. Seen by neurosurgery in  ED.

## 2023-08-11 NOTE — ED PROVIDER NOTE - PROGRESS NOTE DETAILS
Discussed with Dr. monson of neurosurgery from [place] through transfer center.  Patient's recent history findings and exam discussed we will start the patient on a Cardene drip to maintain blood pressure between 140 and 180 systolic and give 500 of Keppra for seizure prophylaxis and transfer HI-1 to [facility]'s ER with

## 2023-08-11 NOTE — ED ADULT NURSE NOTE - OBJECTIVE STATEMENT
78y/o female A&ox4, ambulatory at baseline received in rm14a. pt c/o unwitnessed syncopal episode at home today. was seen in ED this AM for HTN, given PO meds and dc'd. c/o mild headache at this time. denies dizziness, lightheadedness, n/v, sob. pt following commands at this time, answering questions appropriately. respirations even and unlabored, completing full sentences. 20g iv placed, labs sent. md at bedside for eval. bed in lowest position, side rails up, call bell in hand, safety maintained. awaiting further orders. will continue to monitor.

## 2023-08-11 NOTE — ED ADULT TRIAGE NOTE - CHIEF COMPLAINT QUOTE
patient was here earlier and treated for HTN.  the patient states that she has no recollection of being here earlier.  she was given medication here and then they went home.  Family states that they heard a thump and went into room to find patient on floor, lying on back.  Daughter states her eyes were rolled behind her head so they believe she did experience loc for a few seconds.  the patient does not take blood thinners.  the patient states that she feels confused.  she is alert / oriented x4 in triage but states she feels off.

## 2023-08-11 NOTE — ED PROVIDER NOTE - NSFOLLOWUPINSTRUCTIONS_ED_ALL_ED_FT
1. TAKE ALL MEDICATIONS AS DIRECTED.    2. FOR PAIN OR FEVER YOU CAN TAKE IBUPROFEN (MOTRIN, ADVIL) OR ACETAMINOPHEN (TYLENOL) AS NEEDED, AS DIRECTED ON PACKAGING.  3. FOLLOW UP WITH YOUR PRIMARY DOCTOR WITHIN 5 DAYS AS DIRECTED.  4. IF YOU HAD LABS OR IMAGING DONE, YOU WERE GIVEN COPIES OF ALL LABS AND/OR IMAGING RESULTS FROM YOUR ER VISIT--PLEASE TAKE THEM WITH YOU TO YOUR FOLLOW UP APPOINTMENTS.  5. IF NEEDED, CALL PATIENT ACCESS SERVICES AT [phone] TO FIND A PRIMARY CARE PHYSICIAN.  OR CALL [phone] TO MAKE AN APPOINTMENT WITH THE CLINIC.  6. RETURN TO THE ER FOR ANY WORSENING SYMPTOMS OR CONCERNS.    Take lisinopril 40mg daily and chlorthalidone 25mg daily  FOllow up with cardiology in 1 week         English    Hypertension, Adult  High blood pressure (hypertension) is when the force of blood pumping through the arteries is too strong. The arteries are the blood vessels that carry blood from the heart throughout the body. Hypertension forces the heart to work harder to pump blood and may cause arteries to become narrow or stiff. Untreated or uncontrolled hypertension can lead to a heart attack, heart failure, a stroke, kidney disease, and other problems.    A blood pressure reading consists of a higher number over a lower number. Ideally, your blood pressure should be below 120/80. The first ("top") number is called the systolic pressure. It is a measure of the pressure in your arteries as your heart beats. The second ("bottom") number is called the diastolic pressure. It is a measure of the pressure in your arteries as the heart relaxes.    What are the causes?  The exact cause of this condition is not known. There are some conditions that result in high blood pressure.    What increases the risk?  Certain factors may make you more likely to develop high blood pressure. Some of these risk factors are under your control, including:  Smoking.  Not getting enough exercise or physical activity.  Being overweight.  Having too much fat, sugar, calories, or salt (sodium) in your diet.  Drinking too much alcohol.  Other risk factors include:  Having a personal history of heart disease, diabetes, high cholesterol, or kidney disease.  Stress.  Having a family history of high blood pressure and high cholesterol.  Having obstructive sleep apnea.  Age. The risk increases with age.  What are the signs or symptoms?  High blood pressure may not cause symptoms. Very high blood pressure (hypertensive crisis) may cause:  Headache.  Fast or irregular heartbeats (palpitations).  Shortness of breath.  Nosebleed.  Nausea and vomiting.  Vision changes.  Severe chest pain, dizziness, and seizures.  How is this diagnosed?  This condition is diagnosed by measuring your blood pressure while you are seated, with your arm resting on a flat surface, your legs uncrossed, and your feet flat on the floor. The cuff of the blood pressure monitor will be placed directly against the skin of your upper arm at the level of your heart. Blood pressure should be measured at least twice using the same arm. Certain conditions can cause a difference in blood pressure between your right and left arms.    If you have a high blood pressure reading during one visit or you have normal blood pressure with other risk factors, you may be asked to:  Return on a different day to have your blood pressure checked again.  Monitor your blood pressure at home for 1 week or longer.  If you are diagnosed with hypertension, you may have other blood or imaging tests to help your health care provider understand your overall risk for other conditions.    How is this treated?  This condition is treated by making healthy lifestyle changes, such as eating healthy foods, exercising more, and reducing your alcohol intake. You may be referred for counseling on a healthy diet and physical activity.    Your health care provider may prescribe medicine if lifestyle changes are not enough to get your blood pressure under control and if:  Your systolic blood pressure is above 130.  Your diastolic blood pressure is above 80.  Your personal target blood pressure may vary depending on your medical conditions, your age, and other factors.    Follow these instructions at home:  Eating and drinking    A plate with examples of foods in a healthy diet.  Eat a diet that is high in fiber and potassium, and low in sodium, added sugar, and fat. An example of this eating plan is called the DASH diet. DASH stands for Dietary Approaches to Stop Hypertension. To eat this way:  Eat plenty of fresh fruits and vegetables. Try to fill one half of your plate at each meal with fruits and vegetables.  Eat whole grains, such as whole-wheat pasta, brown rice, or whole-grain bread. Fill about one fourth of your plate with whole grains.  Eat or drink low-fat dairy products, such as skim milk or low-fat yogurt.  Avoid fatty cuts of meat, processed or cured meats, and poultry with skin. Fill about one fourth of your plate with lean proteins, such as fish, chicken without skin, beans, eggs, or tofu.  Avoid pre-made and processed foods. These tend to be higher in sodium, added sugar, and fat.  Reduce your daily sodium intake. Many people with hypertension should eat less than 1,500 mg of sodium a day.  Do not drink alcohol if:  Your health care provider tells you not to drink.  You are pregnant, may be pregnant, or are planning to become pregnant.  If you drink alcohol:  Limit how much you have to:  0–1 drink a day for women.  0–2 drinks a day for men.  Know how much alcohol is in your drink. In the [place] one drink equals one 12 oz bottle of beer (355 mL), one 5 oz glass of wine (148 mL), or one 1½ oz glass of hard liquor (44 mL).  Lifestyle    A blood pressure monitor and cuff.   Work with your health care provider to maintain a healthy body weight or to lose weight. Ask what an ideal weight is for you.  Get at least 30 minutes of exercise that causes your heart to beat faster (aerobic exercise) most days of the week. Activities may include walking, swimming, or biking.  Include exercise to strengthen your muscles (resistance exercise), such as Pilates or lifting weights, as part of your weekly exercise routine. Try to do these types of exercises for 30 minutes at least 3 days a week.  Do not use any products that contain nicotine or tobacco. These products include cigarettes, chewing tobacco, and vaping devices, such as e-cigarettes. If you need help quitting, ask your health care provider.  Monitor your blood pressure at home as told by your health care provider.  Keep all follow-up visits. This is important.  Medicines    Take over-the-counter and prescription medicines only as told by your health care provider. Follow directions carefully. Blood pressure medicines must be taken as prescribed.  Do not skip doses of blood pressure medicine. Doing this puts you at risk for problems and can make the medicine less effective.  Ask your health care provider about side effects or reactions to medicines that you should watch for.  Contact a health care provider if you:  Think you are having a reaction to a medicine you are taking.  Have headaches that keep coming back (recurring).  Feel dizzy.  Have swelling in your ankles.  Have trouble with your vision.  Get help right away if you:  Develop a severe headache or confusion.  Have unusual weakness or numbness.  Feel faint.  Have severe pain in your chest or abdomen.  Vomit repeatedly.  Have trouble breathing.  These symptoms may be an emergency. Get help right away. Call 911.  Do not wait to see if the symptoms will go away.  Do not drive yourself to the hospital.  Summary  Hypertension is when the force of blood pumping through your arteries is too strong. If this condition is not controlled, it may put you at risk for serious complications.  Your personal target blood pressure may vary depending on your medical conditions, your age, and other factors. For most people, a normal blood pressure is less than 120/80.  Hypertension is treated with lifestyle changes, medicines, or a combination of both. Lifestyle changes include losing weight, eating a healthy, low-sodium diet, exercising more, and limiting alcohol.  This information is not intended to replace advice given to you by your health care provider. Make sure you discuss any questions you have with your health care provider.    Document Revised: 10/25/2022 Document Reviewed: 10/25/2022  [employer] Patient Education © 2023 [employer]

## 2023-08-11 NOTE — ED PROVIDER NOTE - CLINICAL SUMMARY MEDICAL DECISION MAKING FREE TEXT BOX
The patient transferred rom [place] for SDH and further management of HTN and neurosurgery made aware

## 2023-08-11 NOTE — ED ADULT NURSE REASSESSMENT NOTE - NS ED NURSE REASSESS COMMENT FT1
As per neursurg Dannielle CALDERON note, Q1 neuro checks in place. This RN spoke to Dannielle CALDERON and was told that order in note is to be discontinued and to do Q4 neuro checks until rpt CT head results.

## 2023-08-11 NOTE — ED ADULT NURSE NOTE - NSFALLRISKINTERV_ED_ALL_ED

## 2023-08-11 NOTE — CONSULT NOTE ADULT - ASSESSMENT
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    PLAN:    -Q1h neuro checks until repeat CTH  -Repeat CTH in 6 hrs from initial (~2330)  -STAT CTH for any changes in neuro checks  -If repeat CTH stable, recommend q4h neuro checks while inpatient  -If repeat CTH stable, can f/u with Dr. Herron and Dr. Newman as an outpatient in 4-6 weeks  -Pain control prn, avoid oversedation  -SBP < 160  -Hold any AC/AP  -VTE prophylaxis: SCDs, hold chemoprophylaxis due to: acute SDH  -Recommend trauma eval given fall  -Recommend medicine eval and syncope workup  -Further medical management per primary team  -Discussed case with Dr. 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    PLAN:    -Repeat CTH in 6 hrs from initial (~2330); appears larger on repeat CTH  -Q2h neuro checks  -Repeat CTH in 6 hrs from last (~7 am)  -STAT CTH for any changes in neuro checks  -Pain control prn, avoid oversedation  -SBP < 160  -Hold any AC/AP  -VTE prophylaxis: SCDs, hold chemoprophylaxis due to: acute SDH  -Recommend trauma eval given fall  -Recommend medicine eval and syncope workup  -Further medical management per primary team  -Discussed case with Dr. 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    PLAN:    -Repeat CTH completed 6 hrs from initial; R temporal SDH appears larger   -Q2h neuro checks  -Repeat CTH in 6 hrs from last (~7 am)  -STAT CTH for any changes in neuro checks  -Pain control prn, avoid oversedation  -SBP < 160  -Hold any AC/AP  -VTE prophylaxis: SCDs, hold chemoprophylaxis due to: acute SDH  -Recommend trauma eval given fall  -Recommend medicine eval and syncope workup  -Further medical management per primary team  -Discussed case with Dr. Herron

## 2023-08-11 NOTE — ED PROVIDER NOTE - CLINICAL SUMMARY MEDICAL DECISION MAKING FREE TEXT BOX
77-year-old female with a history of hypertension on lisinopril 20mg BID presents with hypertension.  Patient states recently her blood pressures have been going up and has been in the 180s range.  Patient saw her PCP 4 days ago Dr. Valdovinos and 12.5 mg of hydrochlorothiazide was added to her medication regimen.  Patient has been taking it but woke up this morning and her blood pressure was 205 systolic (prior to taking her medications ) and she was alarmed so she came to the ER.  Patient states she has also been having intermittent chest pain and left upper back pain which she mentioned to her dr, had EKG done that her doctor said was normal.  Patient was also told to follow-up with a cardiologist but has not made an appointment yet.  Patient states she does get Headaches at times at night and has intermittent chest pain as well has the episode today.  Patient was concerned because strokes run in her family and was concerned that if her blood pressure was high she would get a stroke.  At this time patient presents well-appearing neuroexam is within normal limits EKG with T wave inversions inferior laterally.  Patient took her BP about 45 minutes prior to arrival but BP has now come down from 200s to 170s systolic.  Will check labs including troponin will get CT head will have cardiology eval so that she can set up outpatient follow-up

## 2023-08-11 NOTE — CONSULT NOTE ADULT - ATTENDING COMMENTS
recent uncontrolled htn, with bp gen in 160s  lisinopril 20, and hctz recently added  this am bp 200  suspect a component of anx  ekg abnormal partially on the basis of lead placement  repeat ekg and second trop  kian inc lisinopril to 40 and change to chlorthalidone 25  might have considered bb but resting hr 60s  may need addl agent and have instructed her to call next week if bp does not improve  eventual echo and nuc stress pending clinical course  plan to see her in the office 2w

## 2023-08-11 NOTE — ED ADULT NURSE NOTE - NS ED NURSE LEVEL OF CONSCIOUSNESS MENTAL STATUS
PCP would like to reschedule appointment for today. Patient is too early for a f/u. Please schedule patient 3-4 wks out.  Left message to return call. Please inform pt of message.   Awake/Alert

## 2023-08-11 NOTE — ED PROVIDER NOTE - PHYSICAL EXAMINATION
Gen: Well appearing in NAD  Head: NC/AT  Neck: trachea midline  cv: rrr  Resp:  No distress, lungsc lear  Ext: no deformities  Neuro:  A&O appears non focal  Skin:  Warm and dry as visualized  Psych:  Normal affect and mood

## 2023-08-11 NOTE — ED PROVIDER NOTE - CONDITION AT DISCHARGE:
I called mom and she stated she did speak to Dr. Dobbs and was transferred to scheduling to schedule appt with EP.  Appt was made with Dr. Barrett 9/29/2023.  ----- Message from Nacho Dobbs MD sent at 8/7/2023  7:53 AM CDT -----  Please call parents and tell them to come for follow up with ep service for abnormal visit. I have called and spoke with mother. Please arrange for ep visit with schedulers.     
Satisfactory

## 2023-08-11 NOTE — ED ADULT TRIAGE NOTE - CHIEF COMPLAINT QUOTE
transfer from [facility] on cardene 2.5mg/min, Alert and oriented to person, place, and time, given keppra in route, given 650mg po tylenol prior to leaving, was syncope and collapse

## 2023-08-12 DIAGNOSIS — I10 ESSENTIAL (PRIMARY) HYPERTENSION: ICD-10-CM

## 2023-08-12 DIAGNOSIS — R55 SYNCOPE AND COLLAPSE: ICD-10-CM

## 2023-08-12 PROCEDURE — 93005 ELECTROCARDIOGRAM TRACING: CPT

## 2023-08-12 PROCEDURE — 99231 SBSQ HOSP IP/OBS SF/LOW 25: CPT

## 2023-08-12 PROCEDURE — 71046 X-RAY EXAM CHEST 2 VIEWS: CPT

## 2023-08-12 PROCEDURE — 70450 CT HEAD/BRAIN W/O DYE: CPT | Mod: 26

## 2023-08-12 PROCEDURE — 93010 ELECTROCARDIOGRAM REPORT: CPT

## 2023-08-12 PROCEDURE — 84484 ASSAY OF TROPONIN QUANT: CPT

## 2023-08-12 PROCEDURE — 99233 SBSQ HOSP IP/OBS HIGH 50: CPT

## 2023-08-12 PROCEDURE — 36415 COLL VENOUS BLD VENIPUNCTURE: CPT

## 2023-08-12 PROCEDURE — 85025 COMPLETE CBC W/AUTO DIFF WBC: CPT

## 2023-08-12 PROCEDURE — 70450 CT HEAD/BRAIN W/O DYE: CPT | Mod: 26,77

## 2023-08-12 PROCEDURE — 99285 EMERGENCY DEPT VISIT HI MDM: CPT | Mod: 25

## 2023-08-12 PROCEDURE — 80053 COMPREHEN METABOLIC PANEL: CPT

## 2023-08-12 PROCEDURE — 70450 CT HEAD/BRAIN W/O DYE: CPT | Mod: MA

## 2023-08-12 PROCEDURE — 93306 TTE W/DOPPLER COMPLETE: CPT | Mod: 26

## 2023-08-12 PROCEDURE — 93306 TTE W/DOPPLER COMPLETE: CPT

## 2023-08-12 PROCEDURE — 99222 1ST HOSP IP/OBS MODERATE 55: CPT | Mod: FS

## 2023-08-12 RX ORDER — ESCITALOPRAM OXALATE 10 MG/1
1 TABLET, FILM COATED ORAL
Refills: 0 | DISCHARGE

## 2023-08-12 RX ORDER — ESCITALOPRAM OXALATE 10 MG/1
20 TABLET, FILM COATED ORAL DAILY
Refills: 0 | Status: DISCONTINUED | OUTPATIENT
Start: 2023-08-12 | End: 2023-08-13

## 2023-08-12 RX ORDER — ACETAMINOPHEN 500 MG
650 TABLET ORAL EVERY 6 HOURS
Refills: 0 | Status: DISCONTINUED | OUTPATIENT
Start: 2023-08-12 | End: 2023-08-13

## 2023-08-12 RX ORDER — DORZOLAMIDE HYDROCHLORIDE TIMOLOL MALEATE 20; 5 MG/ML; MG/ML
1 SOLUTION/ DROPS OPHTHALMIC
Refills: 0 | Status: DISCONTINUED | OUTPATIENT
Start: 2023-08-12 | End: 2023-08-13

## 2023-08-12 RX ORDER — LANOLIN ALCOHOL/MO/W.PET/CERES
3 CREAM (GRAM) TOPICAL AT BEDTIME
Refills: 0 | Status: DISCONTINUED | OUTPATIENT
Start: 2023-08-12 | End: 2023-08-13

## 2023-08-12 RX ORDER — DORZOLAMIDE HYDROCHLORIDE TIMOLOL MALEATE 20; 5 MG/ML; MG/ML
1 SOLUTION/ DROPS OPHTHALMIC
Refills: 0 | DISCHARGE

## 2023-08-12 RX ORDER — ALPRAZOLAM 0.25 MG
1 TABLET ORAL
Refills: 0 | DISCHARGE

## 2023-08-12 RX ORDER — LISINOPRIL 2.5 MG/1
40 TABLET ORAL DAILY
Refills: 0 | Status: DISCONTINUED | OUTPATIENT
Start: 2023-08-12 | End: 2023-08-13

## 2023-08-12 RX ORDER — TRAZODONE HCL 50 MG
50 TABLET ORAL AT BEDTIME
Refills: 0 | Status: DISCONTINUED | OUTPATIENT
Start: 2023-08-12 | End: 2023-08-13

## 2023-08-12 RX ORDER — ONDANSETRON 8 MG/1
4 TABLET, FILM COATED ORAL EVERY 8 HOURS
Refills: 0 | Status: DISCONTINUED | OUTPATIENT
Start: 2023-08-12 | End: 2023-08-13

## 2023-08-12 RX ADMIN — LISINOPRIL 40 MILLIGRAM(S): 2.5 TABLET ORAL at 06:20

## 2023-08-12 RX ADMIN — Medication 650 MILLIGRAM(S): at 19:44

## 2023-08-12 RX ADMIN — DORZOLAMIDE HYDROCHLORIDE TIMOLOL MALEATE 1 DROP(S): 20; 5 SOLUTION/ DROPS OPHTHALMIC at 17:21

## 2023-08-12 RX ADMIN — Medication 50 MILLIGRAM(S): at 01:33

## 2023-08-12 RX ADMIN — Medication 50 MILLIGRAM(S): at 22:10

## 2023-08-12 RX ADMIN — Medication 650 MILLIGRAM(S): at 01:33

## 2023-08-12 RX ADMIN — Medication 650 MILLIGRAM(S): at 14:16

## 2023-08-12 RX ADMIN — Medication 650 MILLIGRAM(S): at 13:15

## 2023-08-12 RX ADMIN — DORZOLAMIDE HYDROCHLORIDE TIMOLOL MALEATE 1 DROP(S): 20; 5 SOLUTION/ DROPS OPHTHALMIC at 06:20

## 2023-08-12 RX ADMIN — Medication 650 MILLIGRAM(S): at 20:44

## 2023-08-12 RX ADMIN — ESCITALOPRAM OXALATE 20 MILLIGRAM(S): 10 TABLET, FILM COATED ORAL at 17:21

## 2023-08-12 RX ADMIN — Medication 650 MILLIGRAM(S): at 02:40

## 2023-08-12 NOTE — CONSULT NOTE ADULT - NS ATTEND AMEND GEN_ALL_CORE FT
Patient seen and examined by me personally. Briefly this is a 76 yo woman with a  history of pre-multiple myeloma, monoclonal gammopathy, HTN, presenting to the hospital with an unwitnessed fall. CT showed subdural hematoma. Does not remember the event. No chest pain upon awakening. No shortness of breath. No prodrome.    EKG shows NSR without ischemia. No events on monitor/telemetry, Echo without valve or structural abnormality. Troponins negative     Recommendations:   - Continue lisinopril  - Keep on monitor x 24 hours total. If no events, can DC      --  Nic Yoo MD  Interventional and Structural Cardiology  [phone]

## 2023-08-12 NOTE — CHART NOTE - NSCHARTNOTEFT_GEN_A_CORE
ACC: [id number] EXAM:  CT BRAIN   ORDERED BY: MARIA E ZUNIGA   PROCEDURE DATE:  08/12/2023    IMPRESSION: No change in smallright frontal right convexity acute   subdural hematoma compared with 1:00 AM.      -Pt seen and case discussed w/ Dr. Herron  -images reviewed   -no acute neurosurgical intervention  -pt may start lovenox 8/13/23  -pain control as needed, avoid over sedation  -neurosurgery signing off  -reconsult PRN

## 2023-08-12 NOTE — CONSULT NOTE ADULT - PROBLEM SELECTOR RECOMMENDATION 9
asked to eval for syncopal episode  -pt had HTN and syncopal episode with fall at home   -transfer from [place] with Right temporal SD Hemm  -eval by NSX team   -CT head shows slight increase in size of hemm, since yesterday  - hemodynamically stable/  Neuro status stable no deficits noted   -no prior cardiac eval, no reported arrhythmias   -continue to monitor on tele for any arrhythmias   -EKG non ischemic/ Echo with Normal LVF asked to eval for syncopal episode  -pt had HTN and syncopal episode with fall at home   -transfer from [place] with Right temporal SD Hemm  -eval by NSX team   -CT head shows slight increase in size of hemm, since yesterday  - hemodynamically stable/  Neuro status stable no deficits noted   -no prior cardiac eval, no reported arrhythmias   -continue to monitor on tele for any arrhythmias   -EKG non ischemic/ Echo with Normal LVF  pt will f/u with Dr Lopez as out patient asked to eval for syncopal episode  -pt had HTN and syncopal episode with fall at home   -transfer from [place] with Right temporal SD Hemm  -eval by NSX team   -CT head shows slight increase in size of hemm, since yesterday  - hemodynamically stable/  Neuro status stable no deficits noted   -no prior cardiac eval, no reported arrhythmias   -continue to monitor on tele 24 hrs  for any arrhythmias   -EKG non ischemic/ Echo with Normal LVF  pt will f/u with Dr Lopez as out patient

## 2023-08-12 NOTE — PROGRESS NOTE ADULT - ASSESSMENT
76 yo F PMHx HTN, HLD and monoclonal protein that could be precursor from multiple myeloma (monitored by hematology) who presented for syncope resulting in an unwitnessed fall. Imaging showing subdural hematoma.     #subdural hematoma  #syncope  #unwitnessed fall  #headache  - Repeat CT showing increase in subdural hematoma  - neurosurgery consulted, recs appreciated  - neurochecks q2h (given increase in subdural hematoma size)  - tylenol for pain  - TTE unremarkable  -orthostats   -pt passed bedside swallow, diet ordered  -fall precautions  -telemetry monitoring  - CTH repeated this AM, f/u read  - PT eval    #incidental 2-3 mm outpouching of L ICA  - monitor in outpatient setting    #HTN urgency  - restart lisinopril and hold chlorthalidone  - BP improved    #depression/anxiety  c/w escitalopram     DVT ppx: SCDs    Family updated at bedside
This is a 77yf transferred from [facility] with a unwitnessed fall.   Pt has a PMhx of HTN, monoclonal proteins    Pt had meds changed day of syncope?      Plan  -ct of the brain stable  - pt seen with attending Dr Herron no neurosurgical intervention needed  will need to f/u with neurosurgery as an outpatient in 2 weeks.   -pt will follow up with PMD and cardio as outpatient.

## 2023-08-12 NOTE — CONSULT NOTE ADULT - ASSESSMENT
76 yo F PMHx HTN, HLD and monoclonal protein that could be precursor from multiple myeloma (monitored by hematology) who presented for syncope resulting in an unwitnessed fall.    pt claims she was experiencing an episode of uncontrolled HLD, HTN (reports a home systolic reading of over 200), so she went to [place] for treatment of hypertensive urgency. Pt was given an additional dose of her home dose of lisinopril 20 mg PO and HCTZ 12.5 mg once. And was dishcarged with lispinpril 40 mg daily and chlorthalidone 25 mg daily. Pt arrived home and had an unwitnessed fall in her bedroom. Pt states that she suddenly lost conciousness, but does not remember event.  Her daughter heard a thud and found her mom on the floor unconscious, was unconscious for about 30 seconds. She was arousable but confused.  denies complaints of chest pain/sob/dizziness/palps  Pt was taken back to [place] where head CT showed a R temporal subdural hematoma; CTA head showed an incidental 2-3 mm outpouching of L ICA.  Pt transferred to [facility] for further NSX eval  Cardiology consulted 8/12/23 for syncope does not see cardiologist , was about to establish care with Dr Lopez for BP follow up

## 2023-08-12 NOTE — CONSULT NOTE ADULT - PROBLEM SELECTOR RECOMMENDATION 2
- recent uncontrolled HTN   -controlled now on Lisinopril 40mg daily     d/w DR Yoo/ Dr Crouch - recent uncontrolled HTN   -controlled now on Lisinopril 40mg daily     d/w DR Yoo/ Dr Crouch  -will sign off , please call if needed

## 2023-08-12 NOTE — PROGRESS NOTE ADULT - SUBJECTIVE AND OBJECTIVE BOX
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 Pt was seen earlier today at [facility] hospital for elevated BP and her BP medications were adjusted. Per family, they then went to the pharmacy picked up the medication and then went home. The patient was then talking on the phone with a friend when she became unresponsive and her daughter heard a loud thud and found the patient on the ground. Patient had LOC for about 30 seconds per family. Pt is amnesic to events immediately before and following this incident. Reports mild headache but denies any dizziness, vision changes, N/V. Denies any AC/AP use. Was transferred to [facility] for further neurosurgical edinson  TODAY  Pt seen sitting in bed, She denies headache, visual diff or nausea.     MEDICATIONS  (STANDING):  dorzolamide 2%/timolol 0.5% Ophthalmic Solution 1 Drop(s) Both EYES two times a day  escitalopram 20 milliGRAM(s) Oral daily  lisinopril 40 milliGRAM(s) Oral daily    MEDICATIONS  (PRN):  acetaminophen     Tablet .. 650 milliGRAM(s) Oral every 6 hours PRN Temp greater or equal to 38C (100.4F), Mild Pain (1 - 3)  aluminum hydroxide/magnesium hydroxide/simethicone Suspension 30 milliLiter(s) Oral every 4 hours PRN Dyspepsia  melatonin 3 milliGRAM(s) Oral at bedtime PRN Insomnia  ondansetron Injectable 4 milliGRAM(s) IV Push every 8 hours PRN Nausea and/or Vomiting  traZODone 50 milliGRAM(s) Oral at bedtime PRN for insomnia      Allergies  No Known Allergies  Intolerances    Vital Signs Last 24 Hrs  T(C): 37.2 (12 Aug 2023 14:16), Max: 37.3 (11 Aug 2023 21:30)  T(F): 98.9 (12 Aug 2023 14:16), Max: 99.1 (11 Aug 2023 21:30)  HR: 65 (12 Aug 2023 12:00) (61 - 87)  BP: 118/57 (12 Aug 2023 12:00) (95/45 - 198/84)  BP(mean): 72 (12 Aug 2023 12:00) (62 - 95)  RR: 16 (12 Aug 2023 12:00) (14 - 21)  SpO2: 95% (12 Aug 2023 12:00) (94% - 98%)    Parameters below as of 12 Aug 2023 12:00  Patient On (Oxygen Delivery Method): room air     BMI (kg/m2): 21.2 (08-11-23 @ 21:30)      PHYSICAL EXAM  GENERAL: NAD, well-groomed, well-developed  HEAD:  Atraumatic, Normocephalic  EYES: EOMI, PERRLA, conjunctiva and sclera clear  ENMT: No tonsillar erythema, exudates, or enlargement; Moist mucous membranes, Good dentition, No lesions, neg facial  NECK: Supple,  NERVOUS SYSTEM:  Alert & Oriented X3, Good concentration; Motor Strength 5/5 B/L upper and lower extremities; neg pronators.   EXTREMITIES:  2+ Peripheral Pulses, No  edema    LABS:                          15.0   6.16  )-----------( 330      ( 11 Aug 2023 16:45 )             45.4     08-11    134<L>  |  98  |  12  ----------------------------<  141<H>  3.7   |  28  |  0.71    Ca    8.6      11 Aug 2023 16:45  Mg     1.7     08-11    TPro  7.7  /  Alb  3.5  /  TBili  0.3  /  DBili  x   /  AST  33  /  ALT  25  /  AlkPhos  95  08-11    PT/INR - ( 11 Aug 2023 16:45 )   PT: 10.5 sec;   INR: 0.89 ratio         PTT - ( 11 Aug 2023 16:45 )  PTT:27.1 sec  Urinalysis Basic - ( 11 Aug 2023 16:45 )    Color: x / Appearance: x / SG: x / pH: x  Gluc: 141 mg/dL / Ketone: x  / Bili: x / Urobili: x   Blood: x / Protein: x / Nitrite: x   Leuk Esterase: x / RBC: x / WBC x   Sq Epi: x / Non Sq Epi: x / Bacteria: x      I&O's Detail    RADIOLOGY & ADDITIONAL TESTS:  < from: CT Head No Cont (08.12.23 @ 10:41) >  ACC: [id number] EXAM:  CT BRAIN   ORDERED BY: MARIA E ZUNIGA     PROCEDURE DATE:  08/12/2023        < end of copied text >  < from: CT Head No Cont (08.12.23 @ 10:41) >    IMPRESSION: No change in smallright frontal right convexity acute   subdural hematoma compared with 1:00 AM.    --- End of Report ---    < end of copied text >  
[facility] Division of Hospital Medicine  Polo Crouch MD    Chief Complaint:  Patient is a 77y old  Female who presents with a chief complaint of Syncope (12 Aug 2023 10:37)      SUBJECTIVE / OVERNIGHT EVENTS:  Patient seen and examined at bedside. No acute events reported overnight. No new complaints.    MEDICATIONS  (STANDING):  dorzolamide 2%/timolol 0.5% Ophthalmic Solution 1 Drop(s) Both EYES two times a day  escitalopram 20 milliGRAM(s) Oral daily  lisinopril 40 milliGRAM(s) Oral daily    MEDICATIONS  (PRN):  acetaminophen     Tablet .. 650 milliGRAM(s) Oral every 6 hours PRN Temp greater or equal to 38C (100.4F), Mild Pain (1 - 3)  aluminum hydroxide/magnesium hydroxide/simethicone Suspension 30 milliLiter(s) Oral every 4 hours PRN Dyspepsia  melatonin 3 milliGRAM(s) Oral at bedtime PRN Insomnia  ondansetron Injectable 4 milliGRAM(s) IV Push every 8 hours PRN Nausea and/or Vomiting  traZODone 50 milliGRAM(s) Oral at bedtime PRN for insomnia        I&O's Summary      PHYSICAL EXAM:  Vital Signs Last 24 Hrs  T(C): 36.7 (12 Aug 2023 07:58), Max: 37.3 (11 Aug 2023 21:30)  T(F): 98.1 (12 Aug 2023 07:58), Max: 99.1 (11 Aug 2023 21:30)  HR: 63 (12 Aug 2023 08:05) (61 - 87)  BP: 111/58 (12 Aug 2023 08:00) (95/45 - 198/84)  BP(mean): 68 (12 Aug 2023 08:00) (62 - 95)  RR: 17 (12 Aug 2023 08:00) (14 - 21)  SpO2: 95% (12 Aug 2023 08:00) (94% - 98%)    Parameters below as of 12 Aug 2023 08:00  Patient On (Oxygen Delivery Method): room air            CONSTITUTIONAL: NAD  HEENT: NC/AT, PERRL, no JVD  RESPIRATORY: CTA bilaterally, normal effort  CARDIOVASCULAR: RRR, S1/S2+, no m/g/r  ABDOMEN: Nontender to palpation, normoactive bowel sounds, no rebound/guarding  MUSCULOSKELETAL: No edema, cyanosis or deformities.  PSYCH: Calm, affect appropriate.  NEUROLOGY: Awake, alert, no focal neurological deficits.   SKIN: No rashes; no palpable lesions  VASC: Distal pulses palpable    LABS:                        15.0   6.16  )-----------( 330      ( 11 Aug 2023 16:45 )             45.4     08-11    134<L>  |  98  |  12  ----------------------------<  141<H>  3.7   |  28  |  0.71    Ca    8.6      11 Aug 2023 16:45  Mg     1.7     08-11    TPro  7.7  /  Alb  3.5  /  TBili  0.3  /  DBili  x   /  AST  33  /  ALT  25  /  AlkPhos  95  08-11    PT/INR - ( 11 Aug 2023 16:45 )   PT: 10.5 sec;   INR: 0.89 ratio         PTT - ( 11 Aug 2023 16:45 )  PTT:27.1 sec  CARDIAC MARKERS ( 11 Aug 2023 16:45 )  x     / x     / x     / x     / 2.3 ng/mL      Urinalysis Basic - ( 11 Aug 2023 16:45 )    Color: x / Appearance: x / SG: x / pH: x  Gluc: 141 mg/dL / Ketone: x  / Bili: x / Urobili: x   Blood: x / Protein: x / Nitrite: x   Leuk Esterase: x / RBC: x / WBC x   Sq Epi: x / Non Sq Epi: x / Bacteria: x        CAPILLARY BLOOD GLUCOSE      POCT Blood Glucose.: 109 mg/dL (11 Aug 2023 16:39)        RADIOLOGY & ADDITIONAL TESTS:  Results Reviewed:   Imaging Personally Reviewed:  Electrocardiogram Personally Reviewed:

## 2023-08-12 NOTE — ED ADULT NURSE REASSESSMENT NOTE - NS ED NURSE REASSESS COMMENT FT1
RN spoke to neursurg YUMIKO Spicer. Verbal order for Q2 neuro checks to be initiated. Pending SDU bed @ this time. Pt made aware of plan of care and verbalized understanding.

## 2023-08-12 NOTE — CONSULT NOTE ADULT - SUBJECTIVE AND OBJECTIVE BOX
[facility]                                              CARDIOLOGY AT [place] [address]                                             Telephone: [phone]. Fax:[phone]                                                       CARDIOLOGY CONSULTATION NOTE                                                                                             History obtained by: Patient and medical record  Community Cardiologist: [facility] - [facility]    obtained: Yes [  ] No [x  ]  Reason for Consultation: syncope/  SDH   Available out pt records reviewed: Yes [  ] No [  ]    Chief complaint:    Patient is a 77y old  Female who presents with a chief complaint of Syncope (11 Aug 2023 23:20)      HPI: verifed with patient 8/12/23  76 yo F PMHx HTN, HLD and monoclonal protein that could be precursor from multiple myeloma (monitored by hematology) who presented for syncope resulting in an unwitnessed fall.    pt claims she was experiencing an episode of uncontrolled HLD, HTN (reports a home systolic reading of over 200), so she went to [facility] for treatment of hypertensive urgency. Pt was given an additional dose of her home dose of lisinopril 20 mg PO and HCTZ 12.5 mg once. And was dishcarged with lispinpril 40 mg daily and chlorthalidone 25 mg daily. Pt arrived home and had an unwitnessed fall in her bedroom. Pt states that she suddenly lost conciousness, but does not remember event.  Her daughter heard a thud and found her mom on the floor unconscious, was unconscious for about 30 seconds. She was arousable but confused.  denies complaints of chest pain/sob/dizziness/palps  Pt was taken back to [facility] where head CT showed a R temporal subdural hematoma; CTA head showed an incidental 2-3 mm outpouching of L ICA.  Pt transferred to [facility] for further NSX eval  Cardiology consulted 8/12/23 for syncope does not see cardiologist , was about to establish care with Dr Lopez for BP follow up      CARDIAC TESTING   ECHO:    < from: TTE W or WO Ultrasound Enhancing Agent (08.11.23 @ 18:38) >     1. Technically difficult image quality.   2. Left ventricular systolic function is hyperdynamic with an ejection fraction of 70 % by Jacobs's method of disks.   3. Normal right ventricular cavity size and normal right ventricular systolic function.   4. The left atrium is mildly dilated in size.   5. The right atrium is dilated in size.   6. Interatrial septum is aneurysmal.   7. Trileaflet aortic valve with normal systolic excursion. moderate thickening of the aortic valve leaflets.   8. Mild aortic regurgitation.   9. There is moderate calcification of the mitral valve annulus.  10. Mild to moderate mitral regurgitation.  11. Mild tricuspid regurgitation.  12. Mild pulmonic regurgitation.  13. The inferior vena cava is normal in size measuring 1.19 cm in diameter, (normal <2.1cm) with normal inspiratory collapse (normal >50%) consistent with normal right atrial pressure (~3, range 0-5mmHg).  14. Estimated pulmonary artery systolic pressure is 17 mmHg.  15. Trace pericardialeffusion.      PAST MEDICAL HISTORY  HTN (hypertension)  monoclonal protein - hematology      PAST SURGICAL HISTORY  denies    SOCIAL HISTORY:  Denies smoking/alcohol/drugs  CIGARETTES:     ALCOHOL:  DRUGS:    FAMILY HISTORY:    Family History of Cardiovascular Disease:  Yes [  ] No [x  ]  Coronary Artery Disease in first degree relative: Yes [  ] No [ x ]  Sudden Cardiac Death in First degree relative: Yes [  ] No [  x    HOME MEDICATIONS: verified with patient   ALPRAZolam 0.25 mg oral tablet: 1 tablet orally once a day as needed for  anxiety (12 Aug 2023 00:27)  dorzolamide-timolol 2.23%-0.68% (2%-0.5% base) ophthalmic solution: in each eye 2 times a day (12 Aug 2023 00:26)  escitalopram 20 mg oral tablet: 1 orally once a day (at bedtime) (12 Aug 2023 00:26)  traZODone 50 mg oral tablet: 1 tab(s) orally once a day (at bedtime) as needed for  insomnia (12 Aug 2023 00:26)      CURRENT CARDIAC MEDICATIONS:  lisinopril 40 milliGRAM(s) Oral daily  HCTZ -? dose     CURRENT OTHER MEDICATIONS:  acetaminophen     Tablet .. 650 milliGRAM(s) Oral every 6 hours PRN Temp greater or equal to 38C (100.4F), Mild Pain (1 - 3)  escitalopram 20 milliGRAM(s) Oral daily  melatonin 3 milliGRAM(s) Oral at bedtime PRN Insomnia  ondansetron Injectable 4 milliGRAM(s) IV Push every 8 hours PRN Nausea and/or Vomiting  traZODone 50 milliGRAM(s) Oral at bedtime PRN for insomnia  aluminum hydroxide/magnesium hydroxide/simethicone Suspension 30 milliLiter(s) Oral every 4 hours PRN Dyspepsia  dorzolamide 2%/timolol 0.5% Ophthalmic Solution 1 Drop(s) Both EYES two times a day      ALLERGIES:   No Known Allergies      REVIEW OF SYMPTOMS:   CONSTITUTIONAL: No fever, no chills, no weight loss, no weight gain, no fatigue   ENMT:  No vertigo; No sinus or throat pain  NECK: No pain or stiffness  CARDIOVASCULAR: No chest pain, no dyspnea, + syncope/presyncope, no palpitations, no dizziness, no Orthopnea, no Paroxsymal nocturnal dyspnea  RESPIRATORY: no Shortness of breath, no cough, no wheezing  : No dysuria, no hematuria   GI: No dark color stool, no nausea, no diarrhea, no constipation, no abdominal pain   NEURO: No headache, no slurred speech   MUSCULOSKELETAL: No joint pain or swelling; No muscle, back, or extremity pain  PSYCH: No agitation, no anxiety.    ALL OTHER REVIEW OF SYSTEMS ARE NEGATIVE.    VITAL SIGNS:  T(C): 36.7 (08-12-23 @ 07:58), Max: 37.3 (08-11-23 @ 21:30)  T(F): 98.1 (08-12-23 @ 07:58), Max: 99.1 (08-11-23 @ 21:30)  HR: 63 (08-12-23 @ 08:05) (61 - 87)  BP: 111/58 (08-12-23 @ 08:00) (95/45 - 198/84)  RR: 17 (08-12-23 @ 08:00) (14 - 21)  SpO2: 95% (08-12-23 @ 08:00) (94% - 98%)    INTAKE AND OUTPUT:       PHYSICAL EXAM:  Constitutional: Comfortable . No acute distress.   HEENT: Atraumatic and normocephalic , neck is supple . no JVD. No carotid bruit.  CNS: A&Ox3. No focal deficits.   Respiratory: CTAB, unlabored lungs clear   Cardiovascular: RRR normal s1 s2. RRR 63  No murmur. No rubs or gallop.  Gastrointestinal: Soft, non-tender. +Bowel sounds.   Extremities: 2+ Peripheral Pulses, No clubbing, cyanosis, or edema  Psychiatric: Calm . no agitation.   Skin: Warm and dry, no ulcers on extremities     LABS:                            15.0   6.16  )-----------( 330      ( 11 Aug 2023 16:45 )             45.4     08-11    134<L>  |  98  |  12  ----------------------------<  141<H>  3.7   |  28  |  0.71    Ca    8.6      11 Aug 2023 16:45  Mg     1.7     08-11    TPro  7.7  /  Alb  3.5  /  TBili  0.3  /  DBili  x   /  AST  33  /  ALT  25  /  AlkPhos  95  08-11    PT/INR - ( 11 Aug 2023 16:45 )   PT: 10.5 sec;   INR: 0.89 ratio         PTT - ( 11 Aug 2023 16:45 )  PTT:27.1 sec  Urinalysis Basic - ( 11 Aug 2023 16:45 )    Color: x / Appearance: x / SG: x / pH: x  Gluc: 141 mg/dL / Ketone: x  / Bili: x / Urobili: x   Blood: x / Protein: x / Nitrite: x   Leuk Esterase: x / RBC: x / WBC x   Sq Epi: x / Non Sq Epi: x / Bacteria: x              INTERPRETATION OF TELEMETRY: RSR 60 no events on tele     ECG: RSR 64 normal / no acute STTEW changes noted    RADIOLOGY & ADDITIONAL STUDIES:    < from: CT Head No Cont (08.12.23 @ 01:11) >  IMPRESSION:    Mildly increased prominence of the right lateral frontotemporal temporal   convexity subdural hemorrhage, now measuring up to 4 mm in maximum depth.   Minimal associated mass effect without definite shift or herniation.      < end of copied text >

## 2023-08-13 ENCOUNTER — TRANSCRIPTION ENCOUNTER (OUTPATIENT)
Age: 78
End: 2023-08-13

## 2023-08-13 VITALS
HEART RATE: 63 BPM | OXYGEN SATURATION: 98 % | SYSTOLIC BLOOD PRESSURE: 122 MMHG | DIASTOLIC BLOOD PRESSURE: 66 MMHG | RESPIRATION RATE: 17 BRPM

## 2023-08-13 LAB
ALBUMIN SERPL ELPH-MCNC: 3.6 G/DL — SIGNIFICANT CHANGE UP (ref 3.3–5.2)
ALP SERPL-CCNC: 74 U/L — SIGNIFICANT CHANGE UP (ref 40–120)
ALT FLD-CCNC: 13 U/L — SIGNIFICANT CHANGE UP
ANION GAP SERPL CALC-SCNC: 13 MMOL/L — SIGNIFICANT CHANGE UP (ref 5–17)
AST SERPL-CCNC: 23 U/L — SIGNIFICANT CHANGE UP
BILIRUB SERPL-MCNC: 0.2 MG/DL — LOW (ref 0.4–2)
BUN SERPL-MCNC: 13.4 MG/DL — SIGNIFICANT CHANGE UP (ref 8–20)
CALCIUM SERPL-MCNC: 8.7 MG/DL — SIGNIFICANT CHANGE UP (ref 8.4–10.5)
CHLORIDE SERPL-SCNC: 98 MMOL/L — SIGNIFICANT CHANGE UP (ref 96–108)
CO2 SERPL-SCNC: 24 MMOL/L — SIGNIFICANT CHANGE UP (ref 22–29)
CREAT SERPL-MCNC: 0.57 MG/DL — SIGNIFICANT CHANGE UP (ref 0.5–1.3)
EGFR: 92 ML/MIN/1.73M2 — SIGNIFICANT CHANGE UP
GLUCOSE SERPL-MCNC: 97 MG/DL — SIGNIFICANT CHANGE UP (ref 70–99)
HCT VFR BLD CALC: 40.8 % — SIGNIFICANT CHANGE UP (ref 34.5–45)
HGB BLD-MCNC: 13.7 G/DL — SIGNIFICANT CHANGE UP (ref 11.5–15.5)
MCHC RBC-ENTMCNC: 32.4 PG — SIGNIFICANT CHANGE UP (ref 27–34)
MCHC RBC-ENTMCNC: 33.6 GM/DL — SIGNIFICANT CHANGE UP (ref 32–36)
MCV RBC AUTO: 96.5 FL — SIGNIFICANT CHANGE UP (ref 80–100)
PLATELET # BLD AUTO: 277 K/UL — SIGNIFICANT CHANGE UP (ref 150–400)
POTASSIUM SERPL-MCNC: 4.4 MMOL/L — SIGNIFICANT CHANGE UP (ref 3.5–5.3)
POTASSIUM SERPL-SCNC: 4.4 MMOL/L — SIGNIFICANT CHANGE UP (ref 3.5–5.3)
PROT SERPL-MCNC: 6.2 G/DL — LOW (ref 6.6–8.7)
RBC # BLD: 4.23 M/UL — SIGNIFICANT CHANGE UP (ref 3.8–5.2)
RBC # FLD: 12.1 % — SIGNIFICANT CHANGE UP (ref 10.3–14.5)
SODIUM SERPL-SCNC: 135 MMOL/L — SIGNIFICANT CHANGE UP (ref 135–145)
WBC # BLD: 5.18 K/UL — SIGNIFICANT CHANGE UP (ref 3.8–10.5)
WBC # FLD AUTO: 5.18 K/UL — SIGNIFICANT CHANGE UP (ref 3.8–10.5)

## 2023-08-13 PROCEDURE — 36415 COLL VENOUS BLD VENIPUNCTURE: CPT

## 2023-08-13 PROCEDURE — 93005 ELECTROCARDIOGRAM TRACING: CPT

## 2023-08-13 PROCEDURE — 97163 PT EVAL HIGH COMPLEX 45 MIN: CPT

## 2023-08-13 PROCEDURE — 85027 COMPLETE CBC AUTOMATED: CPT

## 2023-08-13 PROCEDURE — 70450 CT HEAD/BRAIN W/O DYE: CPT

## 2023-08-13 PROCEDURE — 99239 HOSP IP/OBS DSCHRG MGMT >30: CPT

## 2023-08-13 PROCEDURE — 99285 EMERGENCY DEPT VISIT HI MDM: CPT

## 2023-08-13 PROCEDURE — 80053 COMPREHEN METABOLIC PANEL: CPT

## 2023-08-13 RX ORDER — TRAZODONE HCL 50 MG
1 TABLET ORAL
Refills: 0 | DISCHARGE

## 2023-08-13 RX ADMIN — DORZOLAMIDE HYDROCHLORIDE TIMOLOL MALEATE 1 DROP(S): 20; 5 SOLUTION/ DROPS OPHTHALMIC at 06:07

## 2023-08-13 RX ADMIN — LISINOPRIL 40 MILLIGRAM(S): 2.5 TABLET ORAL at 06:06

## 2023-08-13 NOTE — DISCHARGE NOTE PROVIDER - PROVIDER TOKENS
PROVIDER:[TOKEN:[[id number]],FOLLOWUP:[2 weeks]],FREE:[LAST:[PCP],PHONE:[(   )    -],FAX:[(   )    -],FOLLOWUP:[1 week]],FREE:[LAST:[Cardiology],PHONE:[(   )    -],FAX:[(   )    -],FOLLOWUP:[2 weeks]]

## 2023-08-13 NOTE — PHYSICAL THERAPY INITIAL EVALUATION ADULT - ADDITIONAL COMMENTS
Pt reports living with spouse in a house with 3 WEST c no rail, and resides on the main level. Pt amb without device and is independent with functional mobility, ADLs, and IADLs. Pt drives and is retired. Pt has support of family. Pt owns no DME.

## 2023-08-13 NOTE — DISCHARGE NOTE PROVIDER - CARE PROVIDER_API CALL
Carmen Herron  Neurosurgery  [address] [place] [id number]  Phone: [phone]  Fax: [phone]  Follow Up Time: 2 weeks    PCP,   Phone: (   )    -  Fax: (   )    -  Follow Up Time: 1 week    Cardiology,   Phone: (   )    -  Fax: (   )    -  Follow Up Time: 2 weeks

## 2023-08-13 NOTE — DISCHARGE NOTE PROVIDER - NSDCMRMEDTOKEN_GEN_ALL_CORE_FT
ALPRAZolam 0.25 mg oral tablet: 1 tablet orally once a day as needed for  anxiety  dorzolamide-timolol 2.23%-0.68% (2%-0.5% base) ophthalmic solution: in each eye 2 times a day  escitalopram 20 mg oral tablet: 1 orally once a day (at bedtime)  lisinopril 40 mg oral tablet: 1 tab(s) orally once a day

## 2023-08-13 NOTE — DISCHARGE NOTE PROVIDER - HOSPITAL COURSE
76 yo F PMHx HTN, HLD and monoclonal protein that could be precursor from multiple myeloma (monitored by hematology) who presented for syncope resulting in an unwitnessed fall. Imaging showing subdural hematoma. Patient was admitted for further care. NSG was consulted and repeat CTH showed a mild increase with no focal neurological deficits. CTH was again repeated showing stable SDH. She was noted to have hypertensive urgency. Cardiology was consulted. She was started on Lisinopril with improvement in BP. Chlorthalidone was held. TTE was unremarkable. Syncope likely in setting of dehydration and medications given recent changes in BP medications. Pt to follow up outpatient w/ her Cardiologist. Pt asymptomatic, requesting to go home. She was evaluated by PT. Pt no longer requires inpatient hospitalization and is stable for discharge with outpatient follow up.

## 2023-08-13 NOTE — DISCHARGE NOTE PROVIDER - ATTENDING DISCHARGE PHYSICAL EXAMINATION:
Vital Signs Last 24 Hrs  T(F): 98.4 (13 Aug 2023 07:52), Max: 98.9 (12 Aug 2023 14:16)  HR: 66 (13 Aug 2023 08:00) (58 - 72)  BP: 117/57 (13 Aug 2023 08:00) (107/48 - 133/83)  RR: 16 (13 Aug 2023 08:00) (14 - 16)  SpO2: 98% (13 Aug 2023 08:00) (95% - 100%)    Physical Exam:  Constitutional: NAD  HEENT: NC/AT, PERRL, EOMI, trachea midline, no JVD  Respiratory: CTA bilaterally, symmetrical chest rise  Cardiovascular: RRR, no m/g/r  Gastrointestinal: Soft, NT/ND, BS+  Vascular: 2+ peripheral pulses  Neurological: A/O x 3, no focal neurological deficits  Psych: Fair mood/affect  Musculoskeletal: No edema, cyanosis, deformities. ROM normal  Skin: No obvious rash, lesions. No jaundice.

## 2023-08-13 NOTE — DISCHARGE NOTE PROVIDER - NSDCCPCAREPLAN_GEN_ALL_CORE_FT
PRINCIPAL DISCHARGE DIAGNOSIS  Diagnosis: Subdural bleeding  Assessment and Plan of Treatment: Repeat CTH stable. Incidental 2-3 mm outpouching of L ICA found. Follow up with Primary Care and Neurosurgery.      SECONDARY DISCHARGE DIAGNOSES  Diagnosis: Syncope  Assessment and Plan of Treatment: likely in setting of dehydration/medications. TTE was unremarkable and orthostatic vitals within normal limits. Follow up with Cardiology.

## 2023-08-13 NOTE — DISCHARGE NOTE NURSING/CASE MANAGEMENT/SOCIAL WORK - PATIENT PORTAL LINK FT
You can access the FollowMyHealth Patient Portal offered by [facility] by registering at the following website: [email or web address]. By joining [employer]’s FollowMyHealth portal, you will also be able to view your health information using other applications (apps) compatible with our system.

## 2023-08-13 NOTE — PHYSICAL THERAPY INITIAL EVALUATION ADULT - PERTINENT HX OF CURRENT PROBLEM, REHAB EVAL
77yf transferred from [facility] with a unwitnessed fall. Pt has a PMhx of HTN, monoclonal proteins.  Pt had meds changed day of syncope.

## 2023-08-14 ENCOUNTER — EMERGENCY (EMERGENCY)
Facility: HOSPITAL | Age: 78
LOS: 1 days | Discharge: HOME | End: 2023-08-14
Attending: EMERGENCY MEDICINE | Admitting: EMERGENCY MEDICINE
Payer: MEDICARE

## 2023-08-14 VITALS
TEMPERATURE: 98 F | OXYGEN SATURATION: 97 % | RESPIRATION RATE: 17 BRPM | HEART RATE: 58 BPM | DIASTOLIC BLOOD PRESSURE: 88 MMHG | SYSTOLIC BLOOD PRESSURE: 179 MMHG

## 2023-08-14 VITALS
OXYGEN SATURATION: 99 % | HEART RATE: 55 BPM | SYSTOLIC BLOOD PRESSURE: 212 MMHG | HEIGHT: 59 IN | WEIGHT: 104.94 LBS | TEMPERATURE: 98 F | RESPIRATION RATE: 20 BRPM | DIASTOLIC BLOOD PRESSURE: 92 MMHG

## 2023-08-14 PROBLEM — Z00.00 ENCOUNTER FOR PREVENTIVE HEALTH EXAMINATION: Status: ACTIVE | Noted: 2023-08-14

## 2023-08-14 LAB
ALBUMIN SERPL ELPH-MCNC: 3.4 G/DL — SIGNIFICANT CHANGE UP (ref 3.3–5)
ALP SERPL-CCNC: 85 U/L — SIGNIFICANT CHANGE UP (ref 40–120)
ALT FLD-CCNC: 26 U/L — SIGNIFICANT CHANGE UP (ref 12–78)
ANION GAP SERPL CALC-SCNC: 5 MMOL/L — SIGNIFICANT CHANGE UP (ref 5–17)
APTT BLD: 29.1 SEC — SIGNIFICANT CHANGE UP (ref 24.5–35.6)
AST SERPL-CCNC: 30 U/L — SIGNIFICANT CHANGE UP (ref 15–37)
BASOPHILS # BLD AUTO: 0.02 K/UL — SIGNIFICANT CHANGE UP (ref 0–0.2)
BASOPHILS NFR BLD AUTO: 0.2 % — SIGNIFICANT CHANGE UP (ref 0–2)
BILIRUB SERPL-MCNC: 0.3 MG/DL — SIGNIFICANT CHANGE UP (ref 0.2–1.2)
BUN SERPL-MCNC: 13 MG/DL — SIGNIFICANT CHANGE UP (ref 7–23)
CALCIUM SERPL-MCNC: 8.8 MG/DL — SIGNIFICANT CHANGE UP (ref 8.5–10.1)
CHLORIDE SERPL-SCNC: 99 MMOL/L — SIGNIFICANT CHANGE UP (ref 96–108)
CO2 SERPL-SCNC: 27 MMOL/L — SIGNIFICANT CHANGE UP (ref 22–31)
CREAT SERPL-MCNC: 0.71 MG/DL — SIGNIFICANT CHANGE UP (ref 0.5–1.3)
EGFR: 86 ML/MIN/1.73M2 — SIGNIFICANT CHANGE UP
EOSINOPHIL # BLD AUTO: 0.09 K/UL — SIGNIFICANT CHANGE UP (ref 0–0.5)
EOSINOPHIL NFR BLD AUTO: 1.1 % — SIGNIFICANT CHANGE UP (ref 0–6)
GLUCOSE SERPL-MCNC: 102 MG/DL — HIGH (ref 70–99)
HCT VFR BLD CALC: 43.6 % — SIGNIFICANT CHANGE UP (ref 34.5–45)
HGB BLD-MCNC: 14.4 G/DL — SIGNIFICANT CHANGE UP (ref 11.5–15.5)
IMM GRANULOCYTES NFR BLD AUTO: 0.5 % — SIGNIFICANT CHANGE UP (ref 0–0.9)
INR BLD: 0.88 RATIO — SIGNIFICANT CHANGE UP (ref 0.85–1.18)
LYMPHOCYTES # BLD AUTO: 1.82 K/UL — SIGNIFICANT CHANGE UP (ref 1–3.3)
LYMPHOCYTES # BLD AUTO: 21.7 % — SIGNIFICANT CHANGE UP (ref 13–44)
MCHC RBC-ENTMCNC: 32.3 PG — SIGNIFICANT CHANGE UP (ref 27–34)
MCHC RBC-ENTMCNC: 33 GM/DL — SIGNIFICANT CHANGE UP (ref 32–36)
MCV RBC AUTO: 97.8 FL — SIGNIFICANT CHANGE UP (ref 80–100)
MONOCYTES # BLD AUTO: 0.58 K/UL — SIGNIFICANT CHANGE UP (ref 0–0.9)
MONOCYTES NFR BLD AUTO: 6.9 % — SIGNIFICANT CHANGE UP (ref 2–14)
NEUTROPHILS # BLD AUTO: 5.84 K/UL — SIGNIFICANT CHANGE UP (ref 1.8–7.4)
NEUTROPHILS NFR BLD AUTO: 69.6 % — SIGNIFICANT CHANGE UP (ref 43–77)
NRBC # BLD: 0 /100 WBCS — SIGNIFICANT CHANGE UP (ref 0–0)
PLATELET # BLD AUTO: 371 K/UL — SIGNIFICANT CHANGE UP (ref 150–400)
POTASSIUM SERPL-MCNC: 4 MMOL/L — SIGNIFICANT CHANGE UP (ref 3.5–5.3)
POTASSIUM SERPL-SCNC: 4 MMOL/L — SIGNIFICANT CHANGE UP (ref 3.5–5.3)
PROT SERPL-MCNC: 7.5 G/DL — SIGNIFICANT CHANGE UP (ref 6–8.3)
PROTHROM AB SERPL-ACNC: 10.3 SEC — SIGNIFICANT CHANGE UP (ref 9.5–13)
RBC # BLD: 4.46 M/UL — SIGNIFICANT CHANGE UP (ref 3.8–5.2)
RBC # FLD: 11.9 % — SIGNIFICANT CHANGE UP (ref 10.3–14.5)
SODIUM SERPL-SCNC: 131 MMOL/L — LOW (ref 135–145)
TROPONIN I, HIGH SENSITIVITY RESULT: 6.8 NG/L — SIGNIFICANT CHANGE UP
TROPONIN I, HIGH SENSITIVITY RESULT: 7.3 NG/L — SIGNIFICANT CHANGE UP
WBC # BLD: 8.39 K/UL — SIGNIFICANT CHANGE UP (ref 3.8–10.5)
WBC # FLD AUTO: 8.39 K/UL — SIGNIFICANT CHANGE UP (ref 3.8–10.5)

## 2023-08-14 PROCEDURE — 70450 CT HEAD/BRAIN W/O DYE: CPT | Mod: 26,MA

## 2023-08-14 PROCEDURE — 71045 X-RAY EXAM CHEST 1 VIEW: CPT | Mod: 26

## 2023-08-14 PROCEDURE — 99285 EMERGENCY DEPT VISIT HI MDM: CPT | Mod: 25

## 2023-08-14 PROCEDURE — 93005 ELECTROCARDIOGRAM TRACING: CPT

## 2023-08-14 PROCEDURE — 71045 X-RAY EXAM CHEST 1 VIEW: CPT

## 2023-08-14 PROCEDURE — 84484 ASSAY OF TROPONIN QUANT: CPT

## 2023-08-14 PROCEDURE — 85610 PROTHROMBIN TIME: CPT

## 2023-08-14 PROCEDURE — 85730 THROMBOPLASTIN TIME PARTIAL: CPT

## 2023-08-14 PROCEDURE — 36415 COLL VENOUS BLD VENIPUNCTURE: CPT

## 2023-08-14 PROCEDURE — 70450 CT HEAD/BRAIN W/O DYE: CPT | Mod: MA

## 2023-08-14 PROCEDURE — 80053 COMPREHEN METABOLIC PANEL: CPT

## 2023-08-14 PROCEDURE — 96375 TX/PRO/DX INJ NEW DRUG ADDON: CPT

## 2023-08-14 PROCEDURE — 99285 EMERGENCY DEPT VISIT HI MDM: CPT

## 2023-08-14 PROCEDURE — 85025 COMPLETE CBC W/AUTO DIFF WBC: CPT

## 2023-08-14 PROCEDURE — 99285 EMERGENCY DEPT VISIT HI MDM: CPT | Mod: FS

## 2023-08-14 PROCEDURE — 96374 THER/PROPH/DIAG INJ IV PUSH: CPT

## 2023-08-14 PROCEDURE — 93010 ELECTROCARDIOGRAM REPORT: CPT

## 2023-08-14 RX ORDER — ACETAMINOPHEN 500 MG
725 TABLET ORAL ONCE
Refills: 0 | Status: COMPLETED | OUTPATIENT
Start: 2023-08-14 | End: 2023-08-14

## 2023-08-14 RX ORDER — HYDRALAZINE HCL 50 MG
1 TABLET ORAL
Qty: 60 | Refills: 0
Start: 2023-08-14 | End: 2023-09-02

## 2023-08-14 RX ORDER — NIFEDIPINE 30 MG
1 TABLET, EXTENDED RELEASE 24 HR ORAL
Qty: 60 | Refills: 0
Start: 2023-08-14 | End: 2023-09-12

## 2023-08-14 RX ORDER — HYDRALAZINE HCL 50 MG
25 TABLET ORAL ONCE
Refills: 0 | Status: COMPLETED | OUTPATIENT
Start: 2023-08-14 | End: 2023-08-14

## 2023-08-14 RX ORDER — LABETALOL HCL 100 MG
10 TABLET ORAL ONCE
Refills: 0 | Status: COMPLETED | OUTPATIENT
Start: 2023-08-14 | End: 2023-08-14

## 2023-08-14 RX ADMIN — Medication 725 MILLIGRAM(S): at 16:15

## 2023-08-14 RX ADMIN — Medication 725 MILLIGRAM(S): at 14:55

## 2023-08-14 RX ADMIN — Medication 10 MILLIGRAM(S): at 14:19

## 2023-08-14 RX ADMIN — Medication 25 MILLIGRAM(S): at 18:04

## 2023-08-14 RX ADMIN — Medication 290 MILLIGRAM(S): at 14:40

## 2023-08-14 NOTE — CONSULT NOTE ADULT - NS ATTEND AMEND GEN_ALL_CORE FT
78 yo F PMHx HTN, HLD and monoclonal protein that could be precursor from multiple myeloma (monitored by hematology) was recently admitted to [facility] for syncope s/p fall.  Of note, he was recently here in [place] (8/11) for uncontrolled HTN who now presents for hypertensive urgency.   She had been checking her BPs at home and noted her SBP to be >220. She was advised to take Chlorthalidone in addition to her ACEI, however her BP remained elevated and so she presented to the ED. Here, she states she is completely asymptomatic without headache, dizziness, blurry vision, chest pain or SOB. Labs show Na 131, trop negative x 1. Would not precipitously drop BP, goal systolic BP would be ~160 in the first 24 hours with a drop of no more than 25% within the first several hours.   Would continue to home regimen of Lisinopril 40mg. Would prefer she be off Chlorthalidone as she states she is never hydrated and Na is already reduced to 131. Would favor switched to a CCB that is easily titratable such as Nifedipine. Can initiate Nifedipine 60mg BID as a home regimen.     Discussed with ED team.     To see Dr. Smallwood in the office this week.

## 2023-08-14 NOTE — ED PROVIDER NOTE - CHPI ED SYMPTOMS NEG
no confusion/no fever/no loss of consciousness/no nausea/no numbness/no vomiting/no change in level of consciousness

## 2023-08-14 NOTE — ED PROVIDER NOTE - PATIENT PORTAL LINK FT
You can access the FollowMyHealth Patient Portal offered by [facility] by registering at the following website: [email or web address]. By joining [employer]’s FollowMyHealth portal, you will also be able to view your health information using other applications (apps) compatible with our system.

## 2023-08-14 NOTE — ED PROVIDER NOTE - PROGRESS NOTE DETAILS
dr fuller cardio will see pt in ed pt seen by cardio advised discontinue chlorthalidone and start hydralazine. cardio advised no hydralazine and start nifedipine rx sent pt advised to NOT take hydralazine. advised out pt follow up. no driving or swimming advised. All imaging and labs reviewed. all results reviewed with pt including abnormal results. pt given a copy of results. pt advised to follow up with pmd regarding abnormal results. All questions answered and concerns addressed. pt verbalized understanding and agreement with plan and dx. pt advised on next step and when/where to follow up. pt advised on all take home and otc medications. pt advised to follow up with PMD. pt advised to return to ed for worsenng symptoms including fever, cp, sob. will dc.

## 2023-08-14 NOTE — CONSULT NOTE ADULT - ASSESSMENT
Assessment/Plan:  76 yo F PMHx HTN, HLD and monoclonal protein that could be precursor from multiple myeloma (monitored by hematology) was recently admitted to [facility] for synope s/p fall.  Of note, he was recently here in [place] (8/11) for uncontrolled HTN and ACEI was increased.  Had fallen upon discharge from [facility], hit his head and found to have Rt temporal subdural hematoma; CTA head showed an incidental 2-3 mm outpouching of Lt ICA.  Was sent to [facility] and evaluated by Neuro surgery.  Subsequent CTH showed increasing SDH.  No neurosurgical intervention needed but was advised to f/u as outpatient; D/C'd from [facility] yesterday, 8/13    Hypertensive Urgency  - Has been having issues with uncontrolled HTN  - Was on Lisinopril 40 mg and Chlorthalidone 25 mg daily since [facility] ED visit but the latter was discontinued at [facility]  - s/p Labetalol 10 mg in ED with modest improvement in BP, systolic 187  - Would give Hydralazine 25 mg PO x 1 now and continue q8H  - Continue Lisinopril 40 mg daily  - Would not resume Chlorthalidone as patient has the tendency to get dehydrated and hyponatremic.  - CT head showed stable SDH, same as prior  - Would not precipitously drop BP.  If Systolic improves to 160 and no further w/u needed, can be discharged  from cardiac standpoint  - Advised to see Dr. Smallwood in 2-3 days.  This was discussed with patient and family at bedside  - If admitted, we will follow    Nancy Coombs DNP, NP-C, AGACNP-C  Cardiology   Call TEAMS           Nancy Coombs DNP, NP-C, AGACNP-C  Cardiology  Call TEAMS   Assessment/Plan:  78 yo F PMHx HTN, HLD and monoclonal protein that could be precursor from multiple myeloma (monitored by hematology) was recently admitted to [facility] for synope s/p fall.  Of note, he was recently here in [place] (8/11) for uncontrolled HTN and ACEI was increased.  Had fallen upon discharge from [facility], hit his head and found to have Rt temporal subdural hematoma; CTA head showed an incidental 2-3 mm outpouching of Lt ICA.  Was sent to [facility] and evaluated by Neuro surgery.  Subsequent CTH showed increasing SDH.  No neurosurgical intervention needed but was advised to f/u as outpatient; D/C'd from [facility] yesterday, 8/13    Hypertensive Urgency  - Has been having issues with uncontrolled HTN  - Was on Lisinopril 40 mg and Chlorthalidone 25 mg daily since [facility] ED visit but the latter was discontinued at [facility]  - s/p Labetalol 10 mg in ED with modest improvement in BP, systolic 187  - Would give Hydralazine 25 mg PO x 1 now   - Continue Lisinopril 40 mg daily  - Would not resume Chlorthalidone as patient has the tendency to get dehydrated and hyponatremic.  - CT head showed stable SDH, same as prior  - Would not precipitously drop BP, goal systolic BP would be ~160 in the first 24 hours with a drop of no more than 25% within the first several hours.  If Systolic improves to 160 and no further w/u needed, can be discharged  from cardiac standpoint  - Advised to see Dr. Smallwood in 2-3 days.  This was discussed with patient and family at bedside  - If admitted, we will follow    Nancy Coombs DNP, NP-C, AGACNP-C  Cardiology   Call TEAMS

## 2023-08-14 NOTE — ED PROVIDER NOTE - NSFOLLOWUPINSTRUCTIONS_ED_ALL_ED_FT
n, Adult  High blood pressure (hypertension) is when the force of blood pumping through the arteries is too strong. The arteries are the blood vessels that carry blood from the heart throughout the body. Hypertension forces the heart to work harder to pump blood and may cause arteries to become narrow or stiff. Untreated or uncontrolled hypertension can lead to a heart attack, heart failure, a stroke, kidney disease, and other problems.    A blood pressure reading consists of a higher number over a lower number. Ideally, your blood pressure should be below 120/80. The first ("top") number is called the systolic pressure. It is a measure of the pressure in your arteries as your heart beats. The second ("bottom") number is called the diastolic pressure. It is a measure of the pressure in your arteries as the heart relaxes.    What are the causes?  The exact cause of this condition is not known. There are some conditions that result in high blood pressure.    What increases the risk?  Certain factors may make you more likely to develop high blood pressure. Some of these risk factors are under your control, including:  Smoking.  Not getting enough exercise or physical activity.  Being overweight.  Having too much fat, sugar, calories, or salt (sodium) in your diet.  Drinking too much alcohol.  Other risk factors include:  Having a personal history of heart disease, diabetes, high cholesterol, or kidney disease.  Stress.  Having a family history of high blood pressure and high cholesterol.  Having obstructive sleep apnea.  Age. The risk increases with age.  What are the signs or symptoms?  High blood pressure may not cause symptoms. Very high blood pressure (hypertensive crisis) may cause:  Headache.  Fast or irregular heartbeats (palpitations).  Shortness of breath.  Nosebleed.  Nausea and vomiting.  Vision changes.  Severe chest pain, dizziness, and seizures.  How is this diagnosed?  This condition is diagnosed by measuring your blood pressure while you are seated, with your arm resting on a flat surface, your legs uncrossed, and your feet flat on the floor. The cuff of the blood pressure monitor will be placed directly against the skin of your upper arm at the level of your heart. Blood pressure should be measured at least twice using the same arm. Certain conditions can cause a difference in blood pressure between your right and left arms.    If you have a high blood pressure reading during one visit or you have normal blood pressure with other risk factors, you may be asked to:  Return on a different day to have your blood pressure checked again.  Monitor your blood pressure at home for 1 week or longer.  If you are diagnosed with hypertension, you may have other blood or imaging tests to help your health care provider understand your overall risk for other conditions.    How is this treated?  This condition is treated by making healthy lifestyle changes, such as eating healthy foods, exercising more, and reducing your alcohol intake. You may be referred for counseling on a healthy diet and physical activity.    Your health care provider may prescribe medicine if lifestyle changes are not enough to get your blood pressure under control and if:  Your systolic blood pressure is above 130.  Your diastolic blood pressure is above 80.  Your personal target blood pressure may vary depending on your medical conditions, your age, and other factors.    Follow these instructions at home:  Eating and drinking    A plate with examples of foods in a healthy diet.  Eat a diet that is high in fiber and potassium, and low in sodium, added sugar, and fat. An example of this eating plan is called the DASH diet. DASH stands for Dietary Approaches to Stop Hypertension. To eat this way:  Eat plenty of fresh fruits and vegetables. Try to fill one half of your plate at each meal with fruits and vegetables.  Eat whole grains, such as whole-wheat pasta, brown rice, or whole-grain bread. Fill about one fourth of your plate with whole grains.  Eat or drink low-fat dairy products, such as skim milk or low-fat yogurt.  Avoid fatty cuts of meat, processed or cured meats, and poultry with skin. Fill about one fourth of your plate with lean proteins, such as fish, chicken without skin, beans, eggs, or tofu.  Avoid pre-made and processed foods. These tend to be higher in sodium, added sugar, and fat.  Reduce your daily sodium intake. Many people with hypertension should eat less than 1,500 mg of sodium a day.  Do not drink alcohol if:  Your health care provider tells you not to drink.  You are pregnant, may be pregnant, or are planning to become pregnant.  If you drink alcohol:  Limit how much you have to:  0–1 drink a day for women.  0–2 drinks a day for men.  Know how much alcohol is in your drink. In the [place] one drink equals one 12 oz bottle of beer (355 mL), one 5 oz glass of wine (148 mL), or one 1½ oz glass of hard liquor (44 mL).  Lifestyle    A blood pressure monitor and cuff.   Work with your health care provider to maintain a healthy body weight or to lose weight. Ask what an ideal weight is for you.  Get at least 30 minutes of exercise that causes your heart to beat faster (aerobic exercise) most days of the week. Activities may include walking, swimming, or biking.  Include exercise to strengthen your muscles (resistance exercise), such as Pilates or lifting weights, as part of your weekly exercise routine. Try to do these types of exercises for 30 minutes at least 3 days a week.  Do not use any products that contain nicotine or tobacco. These products include cigarettes, chewing tobacco, and vaping devices, such as e-cigarettes. If you need help quitting, ask your health care provider.  Monitor your blood pressure at home as told by your health care provider.  Keep all follow-up visits. This is important.  Medicines    Take over-the-counter and prescription medicines only as told by your health care provider. Follow directions carefully. Blood pressure medicines must be taken as prescribed.  Do not skip doses of blood pressure medicine. Doing this puts you at risk for problems and can make the medicine less effective.  Ask your health care provider about side effects or reactions to medicines that you should watch for.  Contact a health care provider if you:  Think you are having a reaction to a medicine you are taking.  Have headaches that keep coming back (recurring).  Feel dizzy.  Have swelling in your ankles.  Have trouble with your vision.  Get help right away if you:  Develop a severe headache or confusion.  Have unusual weakness or numbness.  Feel faint.  Have severe pain in your chest or abdomen.  Vomit repeatedly.  Have trouble breathing.  These symptoms may be an emergency. Get help right away. Call 911.  Do not wait to see if the symptoms will go away.  Do not drive yourself to the hospital.  Summary  Hypertension is when the force of blood pumping through your arteries is too strong. If this condition is not controlled, it may put you at risk for serious complications.  Your personal target blood pressure may vary depending on your medical conditions, your age, and other factors. For most people, a normal blood pressure is less than 120/80.  Hypertension is treated with lifestyle changes, medicines, or a combination of both. Lifestyle changes include losing weight, eating a healthy, low-sodium diet, exercising more, and limiting alcohol.  This information is not intended to replace advice given to you by your health care provider. Make sure you discuss any questions you have with your health care provider. 1. FOLLOW UP WITH YOUR PRIMARY DOCTOR IN 24-48 HOURS.   2. FOLLOW UP WITH ALL SPECIALIST DISCUSSED DURING YOUR VISIT.   3. TAKE ALL MEDICATIONS PRESCRIBED IN THE ER IF ANY ARE PRESCRIBED. CONTINUE YOUR HOME MEDICATIONS UNLESS OTHERWISE ADVISED DIFFERENTLY.   4. RETURN FOR WORSENING SYMPTOMS OR CONCERNS INCLUDING BUT NOT LIMITED TO FEVER, CHEST PAIN, OR TROUBLE BREATHING OR ANY OTHER CONCERNS      STOP CHLORTHALIDONE  DO NOT TAKE HYDRALAZINE  START NIFEDIPINE 60MG TWICE DAILY    High blood pressure (hypertension) is when the force of blood pumping through the arteries is too strong. The arteries are the blood vessels that carry blood from the heart throughout the body. Hypertension forces the heart to work harder to pump blood and may cause arteries to become narrow or stiff. Untreated or uncontrolled hypertension can lead to a heart attack, heart failure, a stroke, kidney disease, and other problems.    A blood pressure reading consists of a higher number over a lower number. Ideally, your blood pressure should be below 120/80. The first ("top") number is called the systolic pressure. It is a measure of the pressure in your arteries as your heart beats. The second ("bottom") number is called the diastolic pressure. It is a measure of the pressure in your arteries as the heart relaxes.    What are the causes?  The exact cause of this condition is not known. There are some conditions that result in high blood pressure.    What increases the risk?  Certain factors may make you more likely to develop high blood pressure. Some of these risk factors are under your control, including:  Smoking.  Not getting enough exercise or physical activity.  Being overweight.  Having too much fat, sugar, calories, or salt (sodium) in your diet.  Drinking too much alcohol.  Other risk factors include:  Having a personal history of heart disease, diabetes, high cholesterol, or kidney disease.  Stress.  Having a family history of high blood pressure and high cholesterol.  Having obstructive sleep apnea.  Age. The risk increases with age.  What are the signs or symptoms?  High blood pressure may not cause symptoms. Very high blood pressure (hypertensive crisis) may cause:  Headache.  Fast or irregular heartbeats (palpitations).  Shortness of breath.  Nosebleed.  Nausea and vomiting.  Vision changes.  Severe chest pain, dizziness, and seizures.  How is this diagnosed?  This condition is diagnosed by measuring your blood pressure while you are seated, with your arm resting on a flat surface, your legs uncrossed, and your feet flat on the floor. The cuff of the blood pressure monitor will be placed directly against the skin of your upper arm at the level of your heart. Blood pressure should be measured at least twice using the same arm. Certain conditions can cause a difference in blood pressure between your right and left arms.    If you have a high blood pressure reading during one visit or you have normal blood pressure with other risk factors, you may be asked to:  Return on a different day to have your blood pressure checked again.  Monitor your blood pressure at home for 1 week or longer.  If you are diagnosed with hypertension, you may have other blood or imaging tests to help your health care provider understand your overall risk for other conditions.    How is this treated?  This condition is treated by making healthy lifestyle changes, such as eating healthy foods, exercising more, and reducing your alcohol intake. You may be referred for counseling on a healthy diet and physical activity.    Your health care provider may prescribe medicine if lifestyle changes are not enough to get your blood pressure under control and if:  Your systolic blood pressure is above 130.  Your diastolic blood pressure is above 80.  Your personal target blood pressure may vary depending on your medical conditions, your age, and other factors.    Follow these instructions at home:  Eating and drinking    A plate with examples of foods in a healthy diet.  Eat a diet that is high in fiber and potassium, and low in sodium, added sugar, and fat. An example of this eating plan is called the DASH diet. DASH stands for Dietary Approaches to Stop Hypertension. To eat this way:  Eat plenty of fresh fruits and vegetables. Try to fill one half of your plate at each meal with fruits and vegetables.  Eat whole grains, such as whole-wheat pasta, brown rice, or whole-grain bread. Fill about one fourth of your plate with whole grains.  Eat or drink low-fat dairy products, such as skim milk or low-fat yogurt.  Avoid fatty cuts of meat, processed or cured meats, and poultry with skin. Fill about one fourth of your plate with lean proteins, such as fish, chicken without skin, beans, eggs, or tofu.  Avoid pre-made and processed foods. These tend to be higher in sodium, added sugar, and fat.  Reduce your daily sodium intake. Many people with hypertension should eat less than 1,500 mg of sodium a day.  Do not drink alcohol if:  Your health care provider tells you not to drink.  You are pregnant, may be pregnant, or are planning to become pregnant.  If you drink alcohol:  Limit how much you have to:  0–1 drink a day for women.  0–2 drinks a day for men.  Know how much alcohol is in your drink. In the [place] one drink equals one 12 oz bottle of beer (355 mL), one 5 oz glass of wine (148 mL), or one 1½ oz glass of hard liquor (44 mL).  Lifestyle    A blood pressure monitor and cuff.   Work with your health care provider to maintain a healthy body weight or to lose weight. Ask what an ideal weight is for you.  Get at least 30 minutes of exercise that causes your heart to beat faster (aerobic exercise) most days of the week. Activities may include walking, swimming, or biking.  Include exercise to strengthen your muscles (resistance exercise), such as Pilates or lifting weights, as part of your weekly exercise routine. Try to do these types of exercises for 30 minutes at least 3 days a week.  Do not use any products that contain nicotine or tobacco. These products include cigarettes, chewing tobacco, and vaping devices, such as e-cigarettes. If you need help quitting, ask your health care provider.  Monitor your blood pressure at home as told by your health care provider.  Keep all follow-up visits. This is important.  Medicines    Take over-the-counter and prescription medicines only as told by your health care provider. Follow directions carefully. Blood pressure medicines must be taken as prescribed.  Do not skip doses of blood pressure medicine. Doing this puts you at risk for problems and can make the medicine less effective.  Ask your health care provider about side effects or reactions to medicines that you should watch for.  Contact a health care provider if you:  Think you are having a reaction to a medicine you are taking.  Have headaches that keep coming back (recurring).  Feel dizzy.  Have swelling in your ankles.  Have trouble with your vision.  Get help right away if you:  Develop a severe headache or confusion.  Have unusual weakness or numbness.  Feel faint.  Have severe pain in your chest or abdomen.  Vomit repeatedly.  Have trouble breathing.  These symptoms may be an emergency. Get help right away. Call 911.  Do not wait to see if the symptoms will go away.  Do not drive yourself to the hospital.  Summary  Hypertension is when the force of blood pumping through your arteries is too strong. If this condition is not controlled, it may put you at risk for serious complications.  Your personal target blood pressure may vary depending on your medical conditions, your age, and other factors. For most people, a normal blood pressure is less than 120/80.  Hypertension is treated with lifestyle changes, medicines, or a combination of both. Lifestyle changes include losing weight, eating a healthy, low-sodium diet, exercising more, and limiting alcohol.  This information is not intended to replace advice given to you by your health care provider. Make sure you discuss any questions you have with your health care provider.

## 2023-08-14 NOTE — ED ADULT TRIAGE NOTE - CHIEF COMPLAINT QUOTE
Patient complaining of high BP, compliant with BP meds, stated took lisinopril 40 at around 9am and chlorthalidone about an hour ago.

## 2023-08-14 NOTE — ED PROVIDER NOTE - CARE PROVIDERS DIRECT ADDRESSES
[email or web address]

## 2023-08-14 NOTE — ED PROVIDER NOTE - CLINICAL SUMMARY MEDICAL DECISION MAKING FREE TEXT BOX
77-year-old female with a history of hypertension, hyperlipidemia, multiple myeloma status post recent hypertensive related subdural, transferred to [facility] on August 11.  Now patient was discharged and came to the ED today for hypertension.  Patient also complaining of mild headache.  Patient does not feel similar to her issue last week.  Patient states last week she had some headache, had a normal CT.  Later patient had a syncopal episode which then had a repeat visit showing a acute subdural.  No chest pain or shortness of breath.  No numbness or tingling.  No focal weakness.  No acute nausea or vomiting.  No visual changes.  No aggravating or alleviating factors otherwise noted.  No other acute injury or complaints.  Exam: Nontoxic, well-appearing.  CTA BL, no W/R/R.  Normal nonfocal detailed neurologic exam.  Normal visual acuity.  EOMI.  Normal cardiac exam.  Abdomen soft, nontender, nondistended.  No C/C/E.  No other acute findings on exam.  Acute headache with some hypertension today, neurologically intact.  Will check labs, repeat CT, cardiology, reeval

## 2023-08-14 NOTE — ED PROVIDER NOTE - OBJECTIVE STATEMENT
pt is a 76yo female with pmhx of SDH, tn, HLD, monoclonal protein that could be a precursor for Multiple myeloma presents with htn. pt reports she was recently discharged yesterday from [facility] with sdh s/p fall on 8/11/21. pt reports at this time she had elevated bp which was treated with lisinopril 40mg and chlorathiadone 25mg. pt reports today she had episode of elevated bp 200/100 with headache and left arm pain. pta pt took lisinopril and chlorthalidone per dr perez who was consulted by pt.  pt denies fever, cp, sob, n/v/d, abd pain, numbness,.

## 2023-08-14 NOTE — ED PROVIDER NOTE - CARE PROVIDER_API CALL
Tommy Smallwood  Cardiovascular Disease  [address] [place] [address]  Phone: [phone]  Fax: [phone]  Follow Up Time: Urgent

## 2023-08-14 NOTE — ED ADULT NURSE NOTE - OBJECTIVE STATEMENT
Pt. received alert and oriented x3 with chief complaint of HTN.  Pt. denies any symptoms at current time. Pt. placed on continuous cardiac monitor with continuous pulse ox. EKG performed at bedside upon arrival.

## 2023-08-14 NOTE — CONSULT NOTE ADULT - SUBJECTIVE AND OBJECTIVE BOX
[facility] - Selin Giraldo, Jus Ro Savella, Cohen Dell  Office Number: [phone]    Initial Consult Note    CHIEF COMPLAINT: Patient is a 77y old  Female who presents with a chief complaint of     HPI: 76 yo F PMHx HTN, HLD and monoclonal protein that could be precursor from multiple myeloma (monitored by hematology) was recently admitted to [facility] for synope s/p fall.  Of note, [place] was [place] (8/[place]) for uncontrolled HTN and ACEI was increased.  Had fallen upon discharge from , hit his head and found to have Rt temporal subdural hematoma; CTA head showed an incidental 2-3 mm outpouching of Lt ICA.  Was sent to [facility] and evaluated by Neuro surgery.  Subsequent CTH showed increasing SDH.  No neurosurgical intervention needed but was advised to f/u as outpatient; D/C'd from [facility] yesterday, 8/13.  Today, he presented c/o elevated BP.  In triage, he was found to be significantly hypertensive (212/92) with HA s/p Labetalol 10 mg IVP and Ofirmev.    PAST MEDICAL & SURGICAL HISTORY:  HTN (hypertension)  SOCIAL HISTORY:  No tobacco, ethanol, or drug abuse.  FAMILY HISTORY:    No family history of acute MI or sudden cardiac death.  MEDICATIONS  (STANDING):    MEDICATIONS  (PRN):    Allergies    No Known Allergies    Intolerances  REVIEW OF SYSTEMS:  CONSTITUTIONAL: No weakness, fevers or chills  EYES/ENT: No visual changes;  No vertigo or throat pain   NECK: No pain or stiffness  RESPIRATORY: No cough, wheezing, hemoptysis; No shortness of breath  CARDIOVASCULAR: No chest pain or palpitations  GASTROINTESTINAL: No abdominal pain. No nausea, vomiting, or hematemesis; No diarrhea or constipation. No melena or hematochezia.  GENITOURINARY: No dysuria, frequency or hematuria  NEUROLOGICAL: No numbness or weakness  SKIN: No itching or rash  All other review of systems is negative unless indicated above  VITAL SIGNS:   Vital Signs Last 24 Hrs  T(C): 36.7 (14 Aug 2023 13:43), Max: 36.7 (14 Aug 2023 13:43)  T(F): 98.1 (14 Aug 2023 13:43), Max: 98.1 (14 Aug 2023 13:43)  HR: 56 (14 Aug 2023 14:50) (53 - 56)  BP: 176/88 (14 Aug 2023 14:50) (176/88 - 216/94)  BP(mean): --  RR: 18 (14 Aug 2023 14:20) (18 - 20)  SpO2: 97% (14 Aug 2023 14:20) (97% - 99%)    Parameters below as of 14 Aug 2023 14:20  Patient On (Oxygen Delivery Method): room air    I&O's Summary    On Exam:  Constitutional: NAD, alert and oriented x 3  Lungs:  Non-labored, breath sounds are clear bilaterally, No wheezing, rales or rhonchi  Cardiovascular: RRR.  S1 and S2 positive.  No murmurs, rubs, gallops or clicks  Gastrointestinal: Bowel Sounds present, soft, nontender.   Lymph: No peripheral edema. No cervical lymphadenopathy.  Neurological: Alert, no focal deficits  Skin: No rashes or ulcers   Psych:  Mood & affect appropriate.    LABS: All Labs Reviewed:                        14.4   8.39  )-----------( 371      ( 14 Aug 2023 14:10 )             43.6                         13.7   5.18  )-----------( 277      ( 13 Aug 2023 07:34 )             40.8                         15.0   6.16  )-----------( 330      ( 11 Aug 2023 16:45 )             45.4     14 Aug 2023 14:10    131    |  99     |  13     ----------------------------<  102    4.0     |  27     |  0.71   13 Aug 2023 07:34    135    |  98     |  13.4   ----------------------------<  97     4.4     |  24.0   |  0.57   11 Aug 2023 16:45    134    |  98     |  12     ----------------------------<  141    3.7     |  28     |  0.71     Ca    8.8        14 Aug 2023 14:10  Ca    8.7        13 Aug 2023 07:34  Ca    8.6        11 Aug 2023 16:45  Mg     1.7       11 Aug 2023 16:45    TPro  7.5    /  Alb  3.4    /  TBili  0.3    /  DBili  x      /  AST  30     /  ALT  26     /  AlkPhos  85     14 Aug 2023 14:10  TPro  6.2    /  Alb  3.6    /  TBili  0.2    /  DBili  x      /  AST  23     /  ALT  13     /  AlkPhos  74     13 Aug 2023 07:34  TPro  7.7    /  Alb  3.5    /  TBili  0.3    /  DBili  x      /  AST  33     /  ALT  25     /  AlkPhos  95     11 Aug 2023 16:45    PT/INR - ( 14 Aug 2023 14:10 )   PT: 10.3 sec;   INR: 0.88 ratio      PTT - ( 14 Aug 2023 14:10 )  PTT:29.1 sec  RADIOLOGY:    TRANSTHORACIC ECHOCARDIOGRAM REPORT  ________________________________________________________________________________                                      _______       Pt. Name:       OCTAVIO KENNEDY Study Date:    8/11/2023  MRN:            [id number]       YOB: 1945  Accession #:    [id number]       Age:           77 years  Account#:       [id number]      Gender:        F  Heart Rate:                     Height:        59.06 in (150.00 cm)  Rhythm:                         Weight:        105.82 lb (48.00 kg)  Blood Pressure: 149/67 mmHg     BSA/BMI:       1.41 m² / 21.33 kg/m²  ________________________________________________________________________________________  Referring Physician:    [id number] Elizabeth Shi  Interpreting Physician: Abigail Ty  Primary Sonographer:    AS    CPT:               ECHO TTE WO CON COMP W DOPP - 51721.m  Indication(s):     Syncope and collapse - R55  Procedure:         Transthoracic echocardiogram with 2-D, M-mode and complete                  spectral and color flow Doppler.  Ordering Location: [facility]  Study Information: Image quality for this study is technically difficult.    _______________________________________________________________________________________     CONCLUSIONS:      1. Technically difficult image quality.   2. Left ventricular systolic function is hyperdynamic with an ejection fraction of 70 % by Jacobs's method of disks.   3. Normal right ventricular cavity size and normal right ventricular systolic function.   4. The left atrium is mildly dilated in size.   5. The right atrium is dilated in size.   6. Interatrial septum is aneurysmal.   7. Trileaflet aortic valve with normal systolic excursion. moderate thickening of the aortic valve leaflets.   8. Mild aortic regurgitation.   9. There is moderate calcification of the mitral valve annulus.  10. Mild to moderate mitral regurgitation.  11. Mild tricuspid regurgitation.  12. Mild pulmonic regurgitation.  13. The inferior vena cava is normal in size measuring 1.19 cm in diameter, (normal <2.1cm) with normal inspiratory collapse (normal >50%) consistent with normal right atrial pressure (~3, range 0-5mmHg).  14. Estimated pulmonary artery systolic pressure is 17 mmHg.  15. Trace pericardialeffusion.    ________________________________________________________________________________________  FINDINGS:     Left Ventricle:  Left ventricular systolic function is hyperdynamic with a calculated ejection fraction of 70 % by the Jacobs's biplane method of disks.     Right Ventricle:  Normal right ventricular cavity size and normal right ventricular systolic function. Tricuspid annular plane systolic excursion (TAPSE) is 2.2 cm (normal >=1.7 cm).     Left Atrium:  The left atrium is mildlydilated in size with an indexed volume of 25.00 ml/m².     Right Atrium:  The right atrium is dilated in size.     Interatrial Septum:  Interatrial septum is aneurysmal.     Aortic Valve:  The aortic valve appears trileaflet with normal systolic excursion. There is moderate thickening of the aortic valve leaflets. There is mild aortic regurgitation.     Mitral Valve:  There is mitral valve thickening of the anterior and posterior leaflets. There is moderate calcification of the mitral valve annulus. There is mild to moderate mitral regurgitation.     Tricuspid Valve:  There is mild tricuspid regurgitation. Estimated pulmonary artery systolic pressure is 17 mmHg.     Pulmonic Valve:  There is mild pulmonic regurgitation.     Pericardium:  There is a trace pericardial effusion.     Systemic Veins:  The inferior vena cava is normal in size measuring 1.19 cm in diameter, (normal <2.1cm) with normal inspiratory collapse (normal >50%) consistent with normal right atrial pressure (~3, range 0-5mmHg).  ____________________________________________________________________  Quantitative Data:  Left Ventricle Measurements: (Indexed to BSA)     IVSd (2D):   1.0 cm  LVPWd (2D):  0.9 cm  LVIDd (2D):  3.6 cm  LVIDs (2D):  2.1 cm  LV Mass:     100 g  71.4 g/m²  LV Vol d, MOD A2C: 34.9 ml 24.79 ml/m²  LV Vol d, MOD A4C: 36.4 ml 25.85 ml/m²  LV Vol d, MOD BP:  35.5 ml 25.22 ml/m²  LV Vol s, MOD A2C: 8.9 ml  6.34 ml/m²  LV Vol s, MOD A4C: 10.6 ml 7.53 ml/m²  LV Vol s, MOD BP:  10.6 ml 7.50 ml/m²  LVOT SV MOD BP:    24.9 ml  LV EF% MOD BP:     70 %     MV E Vmax:    0.70 m/s  MV A Vmax:    1.18 m/s  MV E/A:       0.59  e' lateral:   9.25 cm/s  e' medial:    6.53 cm/s  E/e' lateral: 7.54  E/e' medial:  10.67  E/e' Average: 8.83  MV DT:        311 msec    Aorta Measurements: (normal range) (Indexed to BSA)     Sinuses of Valsalva: 2.80 cm (2.7 - 3.3 cm)cm    Left Atrium Measurements: (Indexed to BSA)  LA Diam 2D: 3.20 cm    Right Ventricle Measurements:     TAPSE:            2.2 cm  TV Hallie. S':       16.70 cm/s  RV Base (RVID1):  2.3 cm  RV Mid (RVID2):   2.2 cm  RV Major (RVID3): 5.8 cm  LVOT / RVOT/ Qp/Qs Data: (Indexed to BSA)  LVOT Diameter: 1.80 cm    Mitral Valve Measurements:     MV E Vmax: 0.7 m/s         MR Vmax:          3.43 m/s  MV A Vmax: 1.2 m/s         MR Peak Gradient: 47.1 mmHg  MV E/A:    0.6  Tricuspid Valve Measurements:     TR Vmax:          1.8 m/s  TR Peak Gradient: 13.5 mmHg  RA Pressure:      3 mmHg  PASP:             17 mg  ________________________________________________________________________________________  Electronically signed on 8/12/2023 at 10:08:29 AM by Abigail Ty   *** Final ***      ACC: [id number] EXAM:  CT BRAIN   ORDERED BY: SHAI TREJO     PROCEDURE DATE:  08/14/2023          INTERPRETATION:  CT BRAIN WITHOUT CONTRAST    INDICATIONS:  Intracranial hemorrhage    TECHNIQUE:  Serial axial images were obtained from theskull base to the   vertex without the use of contrast.    COMPARISON EXAM: 8/12/2023    FINDINGS:  Ventricles and sulci: Parenchymal volume loss is present which is   commensurate with patient age.  Intra-axial: Periventricular small vessel white matter ischemic changes   are appreciated.    Extra-axial: Stable appearing small right posterior temporal parietal   subdural hemorrhage, measures approximately 4 mm in greatest diameter   (coronal 601-30). No significant mass effect on the underlying brain   parenchyma. No right-to-left midline shift.  Calvarium: Intact.    Bilateral optic globes are intact. Imaged paranasal sinuses, bilateral   mastoid air cells, middle ear cavities are clear.    IMPRESSION: Stable appearing small right-sided subdural hemorrhagic   collection compared with prior study dated 8/12/2023 at 10:34 AM.    --- End of Report --  DAWN BEHR-VENTURA MD; Attending Radiologist  This document has been electronically signed. Aug 14 2023  3:22PM    EKG:    Assessment/Plan:  76 yo F PMHx HTN, HLD and monoclonal protein that could be precursor from multiple myeloma (monitored by hematology) was recently admitted to [facility] for synope s/p fall.  Of note, he was recently here in  (8/11) for uncontrolled HTN and ACEI was increased.  Had [place], hit his head and found to have Rt temporal subdural hematoma; CTA head showed an incidental 2-3 mm outpouching of Lt ICA.  Was sent to [facility] and evaluated by Neuro surgery.  Subsequent CTH showed increasing SDH.  No neurosurgical intervention needed but was advised to f/u as outpatient; D/C'd from [facility] yesterday, 8/13    Nancy Coombs DNP, NP-C, AGACNP-C  Cardiology  Call TEAMS       [facility] - Selin Giraldo Pannella, Patel, Savella, Cohen Dell  Office Number: [phone]    Initial Consult Note    CHIEF COMPLAINT: Patient is a 77y old  Female who presents with a chief complaint of     HPI: 78 yo F PMHx HTN, HLD and monoclonal protein that could be precursor from multiple myeloma (monitored by hematology) was recently admitted to [facility] for synope s/p fall.  Of note, he was recently here in  (8/11) for uncontrolled HTN and ACEI was increased.  Had fallen upon discharge from , hit his head and found to have Rt temporal subdural hematoma; CTA head showed an incidental 2-3 mm outpouching of Lt ICA.  Was sent to [facility] and evaluated by Neuro surgery.  Subsequent CTH showed increasing SDH.  No neurosurgical intervention needed but was advised to f/u as outpatient; D/C'd from [facility] yesterday, 8/13.  Today, she presented c/o elevated BP.  States, she was discharged yesterday from [facility] on Lisinopril 40 mg but no Chlorthalidone.  When she took her BP at home, she got systolic of 189.  Went to the pharmacy and rechecked it, obtained systolic >200 and took Chlorthalidone 25 mg x 1.  Went to he [facility] afterwards, they rechecked her BP and still very hypertensive at systolic of 220's.  Was then advised to come to the ED.  In triage, he was found to be significantly hypertensive (212/92.  Denies blurry vision, dizziness, lightheadedness, syncope, CP, palpitations, SOB, CHRISTIAN or edema.  In ED, she was given Labetalol 10 mg IVP and Ofirmev with moderate response.  CT head showed stable SDH as prior.  Upon evaluation, patient is asymptomatic,  .    PAST MEDICAL & SURGICAL HISTORY:  HTN (hypertension)  SOCIAL HISTORY:  No tobacco, ethanol, or drug abuse.  FAMILY HISTORY:    No family history of acute MI or sudden cardiac death.  MEDICATIONS  (STANDING):    MEDICATIONS  (PRN):    Allergies    No Known Allergies    Intolerances  REVIEW OF SYSTEMS:  CONSTITUTIONAL: No weakness, fevers or chills  EYES/ENT: No visual changes;  No vertigo or throat pain   NECK: No pain or stiffness  RESPIRATORY: No cough, wheezing, hemoptysis; No shortness of breath  CARDIOVASCULAR: No chest pain or palpitations  GASTROINTESTINAL: No abdominal pain. No nausea, vomiting, or hematemesis; No diarrhea or constipation. No melena or hematochezia.  GENITOURINARY: No dysuria, frequency or hematuria  NEUROLOGICAL: No numbness or weakness  SKIN: No itching or rash  All other review of systems is negative unless indicated above  VITAL SIGNS:   Vital Signs Last 24 Hrs  T(C): 36.7 (14 Aug 2023 13:43), Max: 36.7 (14 Aug 2023 13:43)  T(F): 98.1 (14 Aug 2023 13:43), Max: 98.1 (14 Aug 2023 13:43)  HR: 56 (14 Aug 2023 14:50) (53 - 56)  BP: 176/88 (14 Aug 2023 14:50) (176/88 - 216/94)  BP(mean): --  RR: 18 (14 Aug 2023 14:20) (18 - 20)  SpO2: 97% (14 Aug 2023 14:20) (97% - 99%)    Parameters below as of 14 Aug 2023 14:20  Patient On (Oxygen Delivery Method): room air    I&O's Summary    On Exam:  Constitutional: NAD, alert and oriented x 3  Lungs:  Non-labored, breath sounds are clear bilaterally, No wheezing, rales or rhonchi  Cardiovascular: RRR.  S1 and S2 positive.  +murmurs, no  rubs, gallops or clicks  Gastrointestinal: Bowel Sounds present, soft, nontender.   Lymph: No peripheral edema. No cervical lymphadenopathy.  Neurological: Alert, no focal deficits  Skin: No rashes or ulcers   Psych:  Mood & affect appropriate.    LABS: All Labs Reviewed:                        14.4   8.39  )-----------( 371      ( 14 Aug 2023 14:10 )             43.6                         13.7   5.18  )-----------( 277      ( 13 Aug 2023 07:34 )             40.8                         15.0   6.16  )-----------( 330      ( 11 Aug 2023 16:45 )             45.4     14 Aug 2023 14:10    131    |  99     |  13     ----------------------------<  102    4.0     |  27     |  0.71   13 Aug 2023 07:34    135    |  98     |  13.4   ----------------------------<  97     4.4     |  24.0   |  0.57   11 Aug 2023 16:45    134    |  98     |  12     ----------------------------<  141    3.7     |  28     |  0.71     Ca    8.8        14 Aug 2023 14:10  Ca    8.7        13 Aug 2023 07:34  Ca    8.6        11 Aug 2023 16:45  Mg     1.7       11 Aug 2023 16:45    TPro  7.5    /  Alb  3.4    /  TBili  0.3    /  DBili  x      /  AST  30     /  ALT  26     /  AlkPhos  85     14 Aug 2023 14:10  TPro  6.2    /  Alb  3.6    /  TBili  0.2    /  DBili  x      /  AST  23     /  ALT  13     /  AlkPhos  74     13 Aug 2023 07:34  TPro  7.7    /  Alb  3.5    /  TBili  0.3    /  DBili  x      /  AST  33     /  ALT  25     /  AlkPhos  95     11 Aug 2023 16:45    PT/INR - ( 14 Aug 2023 14:10 )   PT: 10.3 sec;   INR: 0.88 ratio      PTT - ( 14 Aug 2023 14:10 )  PTT:29.1 sec  RADIOLOGY:    TRANSTHORACIC ECHOCARDIOGRAM REPORT  ________________________________________________________________________________                                      _______       Pt. Name:       OCTAVIO KENNEDY Study Date:    8/11/2023  MRN:            [id number]       YOB: 1945  Accession #:    [id number]       Age:           77 years  Account#:       [id number]      Gender:        F  Heart Rate:                     Height:        59.06 in (150.00 cm)  Rhythm:                         Weight:        105.82 lb (48.00 kg)  Blood Pressure: 149/67 mmHg     BSA/BMI:       1.41 m² / 21.33 kg/m²  ________________________________________________________________________________________  Referring Physician:    [id number] Elizabeth Shi  Interpreting Physician: Abigail Ty  Primary Sonographer:    AS    CPT:               ECHO TTE WO CON COMP W DOPP - 71619.m  Indication(s):     Syncope and collapse - R55  Procedure:         Transthoracic echocardiogram with 2-D, M-mode and complete                  spectral and color flow Doppler.  Ordering Location: [facility]  Study Information: Image quality for this study is technically difficult.  _______________________________________________________________________________________     CONCLUSIONS:      1. Technically difficult image quality.   2. Left ventricular systolic function is hyperdynamic with an ejection fraction of 70 % by Jacobs's method of disks.   3. Normal right ventricular cavity size and normal right ventricular systolic function.   4. The left atrium is mildly dilated in size.   5. The right atrium is dilated in size.   6. Interatrial septum is aneurysmal.   7. Trileaflet aortic valve with normal systolic excursion. moderate thickening of the aortic valve leaflets.   8. Mild aortic regurgitation.   9. There is moderate calcification of the mitral valve annulus.  10. Mild to moderate mitral regurgitation.  11. Mild tricuspid regurgitation.  12. Mild pulmonic regurgitation.  13. The inferior vena cava is normal in size measuring 1.19 cm in diameter, (normal <2.1cm) with normal inspiratory collapse (normal >50%) consistent with normal right atrial pressure (~3, range 0-5mmHg).  14. Estimated pulmonary artery systolic pressure is 17 mmHg.  15. Trace pericardialeffusion.    ________________________________________________________________________________________  FINDINGS:     Left Ventricle:  Left ventricular systolic function is hyperdynamic with a calculated ejection fraction of 70 % by the Jacobs's biplane method of disks.     Right Ventricle:  Normal right ventricular cavity size and normal right ventricular systolic function. Tricuspid annular plane systolic excursion (TAPSE) is 2.2 cm (normal >=1.7 cm).     Left Atrium:  The left atrium is mildlydilated in size with an indexed volume of 25.00 ml/m².     Right Atrium:  The right atrium is dilated in size.     Interatrial Septum:  Interatrial septum is aneurysmal.     Aortic Valve:  The aortic valve appears trileaflet with normal systolic excursion. There is moderate thickening of the aortic valve leaflets. There is mild aortic regurgitation.     Mitral Valve:  There is mitral valve thickening of the anterior and posterior leaflets. There is moderate calcification of the mitral valve annulus. There is mild to moderate mitral regurgitation.     Tricuspid Valve:  There is mild tricuspid regurgitation. Estimated pulmonary artery systolic pressure is 17 mmHg.     Pulmonic Valve:  There is mild pulmonic regurgitation.     Pericardium:  There is a trace pericardial effusion.     Systemic Veins:  The inferior vena cava is normal in size measuring 1.19 cm in diameter, (normal <2.1cm) with normal inspiratory collapse (normal >50%) consistent with normal right atrial pressure (~3, range 0-5mmHg).  ____________________________________________________________________  Quantitative Data:  Left Ventricle Measurements: (Indexed to BSA)     IVSd (2D):   1.0 cm  LVPWd (2D):  0.9 cm  LVIDd (2D):  3.6 cm  LVIDs (2D):  2.1 cm  LV Mass:     100 g  71.4 g/m²  LV Vol d, MOD A2C: 34.9 ml 24.79 ml/m²  LV Vol d, MOD A4C: 36.4 ml 25.85 ml/m²  LV Vol d, MOD BP:  35.5 ml 25.22 ml/m²  LV Vol s, MOD A2C: 8.9 ml  6.34 ml/m²  LV Vol s, MOD A4C: 10.6 ml 7.53 ml/m²  LV Vol s, MOD BP:  10.6 ml 7.50 ml/m²  LVOT SV MOD BP:    24.9 ml  LV EF% MOD BP:     70 %     MV E Vmax:    0.70 m/s  MV A Vmax:    1.18 m/s  MV E/A:       0.59  e' lateral:   9.25 cm/s  e' medial:    6.53 cm/s  E/e' lateral: 7.54  E/e' medial:  10.67  E/e' Average: 8.83  MV DT:        311 msec    Aorta Measurements: (normal range) (Indexed to BSA)     Sinuses of Valsalva: 2.80 cm (2.7 - 3.3 cm)cm    Left Atrium Measurements: (Indexed to BSA)  LA Diam 2D: 3.20 cm    Right Ventricle Measurements:     TAPSE:            2.2 cm  TV Hallie. S':       16.70 cm/s  RV Base (RVID1):  2.3 cm  RV Mid (RVID2):   2.2 cm  RV Major (RVID3): 5.8 cm  LVOT / RVOT/ Qp/Qs Data: (Indexed to BSA)  LVOT Diameter: 1.80 cm    Mitral Valve Measurements:     MV E Vmax: 0.7 m/s         MR Vmax:          3.43 m/s  MV A Vmax: 1.2 m/s         MR Peak Gradient: 47.1 mmHg  MV E/A:    0.6  Tricuspid Valve Measurements:     TR Vmax:          1.8 m/s  TR Peak Gradient: 13.5 mmHg  RA Pressure:      3 mmHg  PASP:             17 mg  ________________________________________________________________________________________  Electronically signed on 8/12/2023 at 10:08:29 AM by Abigail Ty   *** Final ***      ACC: [id number] EXAM:  CT BRAIN   ORDERED BY: SHAI TREJO     PROCEDURE DATE:  08/14/2023          INTERPRETATION:  CT BRAIN WITHOUT CONTRAST    INDICATIONS:  Intracranial hemorrhage    TECHNIQUE:  Serial axial images were obtained from theskull base to the   vertex without the use of contrast.    COMPARISON EXAM: 8/12/2023    FINDINGS:  Ventricles and sulci: Parenchymal volume loss is present which is   commensurate with patient age.  Intra-axial: Periventricular small vessel white matter ischemic changes   are appreciated.    Extra-axial: Stable appearing small right posterior temporal parietal   subdural hemorrhage, measures approximately 4 mm in greatest diameter   (coronal 601-30). No significant mass effect on the underlying brain   parenchyma. No right-to-left midline shift.  Calvarium: Intact.    Bilateral optic globes are intact. Imaged paranasal sinuses, bilateral   mastoid air cells, middle ear cavities are clear.    IMPRESSION: Stable appearing small right-sided subdural hemorrhagic   collection compared with prior study dated 8/12/2023 at 10:34 AM.    --- End of Report --  DAWN BEHR-VENTURA MD; Attending Radiologist  This document has been electronically signed. Aug 14 2023  3:22PM    EKG:     Ventricular Rate 52 BPM    Atrial Rate 52 BPM    P-R Interval 136 ms    QRS Duration 70 ms    Q-T Interval 438 ms    QTC Calculation(Bazett) 407 ms    P Axis 75 degrees    R Axis 46 degrees    T Axis 51 degrees    Diagnosis Line Sinus bradycardia  Otherwise normal ECG  When compared with ECG of 11-AUG-2023 16:45,  No significant change was found  Confirmed by Kari Cueva ([id number]) on 8/14/2023 2:48:56 PM

## 2023-08-17 ENCOUNTER — NON-APPOINTMENT (OUTPATIENT)
Age: 78
End: 2023-08-17

## 2023-08-17 ENCOUNTER — APPOINTMENT (OUTPATIENT)
Dept: CARDIOLOGY | Facility: CLINIC | Age: 78
End: 2023-08-17
Payer: MEDICARE

## 2023-08-17 VITALS
BODY MASS INDEX: 20.16 KG/M2 | DIASTOLIC BLOOD PRESSURE: 72 MMHG | WEIGHT: 100 LBS | HEIGHT: 59 IN | OXYGEN SATURATION: 99 % | SYSTOLIC BLOOD PRESSURE: 144 MMHG | HEART RATE: 65 BPM

## 2023-08-17 DIAGNOSIS — Z78.9 OTHER SPECIFIED HEALTH STATUS: ICD-10-CM

## 2023-08-17 DIAGNOSIS — F41.9 ANXIETY DISORDER, UNSPECIFIED: ICD-10-CM

## 2023-08-17 DIAGNOSIS — H40.9 UNSPECIFIED GLAUCOMA: ICD-10-CM

## 2023-08-17 DIAGNOSIS — D47.2 MONOCLONAL GAMMOPATHY: ICD-10-CM

## 2023-08-17 PROCEDURE — 99215 OFFICE O/P EST HI 40 MIN: CPT

## 2023-08-17 PROCEDURE — 93000 ELECTROCARDIOGRAM COMPLETE: CPT

## 2023-08-17 RX ORDER — ATORVASTATIN CALCIUM 10 MG/1
10 TABLET, FILM COATED ORAL
Refills: 0 | Status: ACTIVE | COMMUNITY

## 2023-08-17 RX ORDER — ESCITALOPRAM OXALATE 20 MG/1
20 TABLET ORAL
Refills: 0 | Status: ACTIVE | COMMUNITY

## 2023-08-17 RX ORDER — DORZOLAMIDE HYDROCHLORIDE TIMOLOL MALEATE 20; 5 MG/ML; MG/ML
22.3-6.8 SOLUTION/ DROPS OPHTHALMIC
Refills: 0 | Status: ACTIVE | COMMUNITY

## 2023-08-17 RX ORDER — ALPRAZOLAM 0.25 MG/1
0.25 TABLET ORAL
Refills: 0 | Status: ACTIVE | COMMUNITY

## 2023-08-17 NOTE — HISTORY OF PRESENT ILLNESS
[FreeTextEntry1] : Abigail presented to the office today for a follow-up evaluation.  She was recently in the hospital several times.  She is now 77 years old, with a history of hypertension and hypercholesterolemia.  She has a monoclonal gammopathy, followed by hematology.  She was seen in the emergency department on August 11, with uncontrolled hypertension.  Her medication was adjusted, with the addition of chlorthalidone.  She was readmitted the same day having had an episode of syncope, with an unwitnessed fall.  She had a subdural hematoma at that time.  She had no focal deficits.  She was transferred to [facility], where she was observed for several days, and then discharged.  She was reevaluated in the emergency department after discharge with significant hypertension.  She was put on nifedipine at that time.  She presents to the office today having been feeling well.  She reports no chest discomfort or shortness of breath suggestive of angina.  She denies orthopnea, PND and lower extremity edema.  She denies palpitations, dizziness.  She reports that her blood pressure since her most recent medication adjustment has been labile, but generally much better controlled overall.

## 2023-08-17 NOTE — PHYSICAL EXAM
CONSULTATION NOTE      :                                                          1954  DATE OF CONSULTATION:                       2023   REQUESTING PHYSICIAN:                   Deja Roa MD  REASON FOR CONSULTATION:           Prostate cancer (HCC)  - Stage IIIA (cT2c, cN0, cM0, PSA: 53.4, Grade Group: 3)         BRIEF HISTORY:  The patient is a very pleasant 68 y.o. male  with metastatic castrate resistant prostate cancer.  He was previously treated with stereotactic body radiotherapy and androgen ablation.  PSA subsequently increased to a value of 35.1 in 2023.  PSMA PET scan showed widespread hypermetabolic bone metastases.  No organ involvement or adenopathy.  No evidence of recurrence in the lower pelvis prostate region.  He recently initiated apalutamide under the care of Dr. Ugalde.  He has tolerated that treatment well.  Over the last couple weeks he has had worsening persistent bilateral rib pain, worse with activities and standing.  Pain is up to a 5/10 in severity.  Previous PET scan did show evidence of bilateral rib metastases.    Labs from 2023  PSA 35.1 ng/ml  WBC 5.29  Hemoglobin 14.6  Hematocrit 41.9%  Platelets 251,000  Alkaline phosphatase 118    Repeat labs drawn today.    Allergy:   Allergies   Allergen Reactions   • Penicillins Hives       Social History:   Social History     Socioeconomic History   • Marital status:    Tobacco Use   • Smoking status: Former     Packs/day: 0.50     Years: 10.00     Pack years: 5.00     Types: Cigarettes     Start date: 1967     Quit date: 1977     Years since quittin.1   • Smokeless tobacco: Never   Vaping Use   • Vaping Use: Never used   Substance and Sexual Activity   • Alcohol use: Yes     Alcohol/week: 14.0 standard drinks     Types: 14 Cans of beer per week     Comment: 2 per day   • Drug use: Never   • Sexual activity: Not Currently     Partners: Female     Birth control/protection: None       Past  "Medical History:   Past Medical History:   Diagnosis Date   • Benign prostatic hyperplasia 11/21/2021   • Elevated PSA 11-20-21   • Erectile dysfunction 04/22   • History of radiation therapy 04/18/2022    Prostate/pelvis   • Hypertension 12/2/22    Blood pressure started getting high after first of year   • Prostate cancer (HCC)        Family History: family history includes Anesthesia problems in his father; Cancer in his father and mother; Emphysema in his father; Lymphoma in his mother; Stroke in his brother.     Surgical History:   Past Surgical History:   Procedure Laterality Date   • COLONOSCOPY  12/27/2016   • KNEE CARTILAGE SURGERY Left    • PROSTATE BIOPSY     • PROSTATE FIDUCIAL MARKER PLACEMENT          Review of Systems:   Review of Systems   Musculoskeletal:        Bilateral ribs   All other systems reviewed and are negative.          Objective   VITAL SIGNS:   Vitals:    02/17/23 1046   BP: 145/68   Pulse: 68   Resp: 16   Temp: 97.3 °F (36.3 °C)   TempSrc: Skin   SpO2: 95%  Comment: RA   Weight: 92.6 kg (204 lb 1.6 oz)   Height: 185.4 cm (73\")   PainSc: 0-No pain        Karnofsky score: 90   KSP %:  90    Physical Exam:   Physical Exam  Vitals and nursing note reviewed.   Constitutional:       Appearance: He is well-developed.   HENT:      Head: Normocephalic and atraumatic.   Cardiovascular:      Rate and Rhythm: Normal rate and regular rhythm.      Heart sounds: Normal heart sounds. No murmur heard.  Pulmonary:      Effort: Pulmonary effort is normal.      Breath sounds: Normal breath sounds. No wheezing or rales.   Abdominal:      General: Bowel sounds are normal. There is no distension.      Palpations: Abdomen is soft.      Tenderness: There is no abdominal tenderness.   Musculoskeletal:         General: Tenderness ( Pain over the ribs bilaterally, most severe over the anterior costal  margins.) present. Normal range of motion.      Cervical back: Normal range of motion and neck supple. "   Lymphadenopathy:      Cervical: No cervical adenopathy.      Upper Body:      Right upper body: No supraclavicular adenopathy.      Left upper body: No supraclavicular adenopathy.   Skin:     General: Skin is warm and dry.   Neurological:      Mental Status: He is alert and oriented to person, place, and time.      Sensory: No sensory deficit.   Psychiatric:         Behavior: Behavior normal.         Thought Content: Thought content normal.         Judgment: Judgment normal.              The following portions of the patient's history were reviewed and updated as appropriate: allergies, current medications, past family history, past medical history, past social history, past surgical history and problem list.    Assessment:   Assessment      Castrate resistant metastatic prostate cancer with primarily bone only disease.  He is tolerating the addition of apalutamide.  PSA and other labs from today are pending.  He has worsening pain in the bilateral ribs which correlates with the PET positive foci of bone metastases.  Palliative radiotherapy is indicated for pain control.  If the pain also correlates with nuclear medicine bone scan foci of uptake, he would be a candidate for radionuclide therapy such as radium 223 Xofigo.  Blood counts have been adequate for radionuclide therapy and he is not currently on any cytotoxic agents.  I have discussed this plan with his medical oncologist, Dr. Ugalde, who is in agreement with evaluating patient for radionuclide therapy.    RECOMMENDATIONS: Nuclear medicine bone scan.  I will follow-up on today's lab results.  Reevaluation and consent for radionuclide therapy.    I spent a total of 25 minutes on [facility] visit, with more than 20 minutes in direct face to face communication, and the remainder of the time spent in reviewing the relevant history, records, available imaging, and for documentation.    Follow Up:   Return in about 1 week (around 2/24/2023) for Imaging - See orders,  Office Visit.  Diagnoses and all orders for this visit:    1. Bone metastases (HCC) (Primary)         Remigio Haines MD     Approximately 40 minutes was spent minutes visit, 15 minutes directly with patient.     [Well Developed] : well developed [Well Nourished] : well nourished [No Acute Distress] : no acute distress [Normal Conjunctiva] : normal conjunctiva [Normal Venous Pressure] : normal venous pressure [No Carotid Bruit] : no carotid bruit [Normal S1, S2] : normal S1, S2 [No Murmur] : no murmur [No Gallop] : no gallop [No Rub] : no rub [Clear Lung Fields] : clear lung fields [Good Air Entry] : good air entry [No Respiratory Distress] : no respiratory distress  [Soft] : abdomen soft [Non Tender] : non-tender [No Masses/organomegaly] : no masses/organomegaly [Normal Bowel Sounds] : normal bowel sounds [Normal Gait] : normal gait [No Edema] : no edema [No Cyanosis] : no cyanosis [No Clubbing] : no clubbing [No Varicosities] : no varicosities [No Rash] : no rash [No Skin Lesions] : no skin lesions [Moves all extremities] : moves all extremities [No Focal Deficits] : no focal deficits [Normal Speech] : normal speech [Alert and Oriented] : alert and oriented [Normal memory] : normal memory

## 2023-08-17 NOTE — DISCUSSION/SUMMARY
[FreeTextEntry1] : She has had a recent complicated set of hospitalizations, which were reviewed in detail.  She has had a history of hypertension, previously reasonably controlled, with a spike in her blood pressure control recently, for unclear reasons.  Is unclear to what degree she may have reactive hypertension, and to what degree she has uncontrolled essential hypertension.  For now, she will continue her present medical regimen.  She will schedule a 24-hour blood pressure monitor.  I reviewed her echocardiogram from August 2023.  This revealed a normal ejection fraction, with mild to moderate mitral regurgitation.  She will see me in 1 month.  She has an appointment to see neurology in 1 week.  Hopefully she will receive clearance to resume her normal physical activities at that time.

## 2023-08-21 ENCOUNTER — APPOINTMENT (OUTPATIENT)
Dept: CARDIOLOGY | Facility: CLINIC | Age: 78
End: 2023-08-21
Payer: MEDICARE

## 2023-08-21 PROCEDURE — 93790 AMBL BP MNTR W/SW I&R: CPT

## 2023-08-25 ENCOUNTER — APPOINTMENT (OUTPATIENT)
Dept: NEUROSURGERY | Facility: CLINIC | Age: 78
End: 2023-08-25
Payer: MEDICARE

## 2023-08-25 ENCOUNTER — NON-APPOINTMENT (OUTPATIENT)
Age: 78
End: 2023-08-25

## 2023-08-25 VITALS
SYSTOLIC BLOOD PRESSURE: 168 MMHG | DIASTOLIC BLOOD PRESSURE: 71 MMHG | TEMPERATURE: 97.2 F | OXYGEN SATURATION: 98 % | HEART RATE: 61 BPM | HEIGHT: 59 IN | WEIGHT: 102 LBS | BODY MASS INDEX: 20.56 KG/M2

## 2023-08-25 DIAGNOSIS — S06.5XAA TRAUMATIC SUBDURAL HEMORRHAGE WITH LOSS OF CONSCIOUSNESS STATUS UNKNOWN, INITIAL ENCOUNTER: ICD-10-CM

## 2023-08-25 DIAGNOSIS — Z86.79 PERSONAL HISTORY OF OTHER DISEASES OF THE CIRCULATORY SYSTEM: ICD-10-CM

## 2023-08-25 PROCEDURE — 99214 OFFICE O/P EST MOD 30 MIN: CPT

## 2023-08-25 NOTE — REVIEW OF SYSTEMS
[As Noted in HPI] : as noted in HPI [Negative] : Heme/Lymph [de-identified] : mild intermittent headaches

## 2023-08-25 NOTE — PHYSICAL EXAM
[General Appearance - Alert] : alert [General Appearance - In No Acute Distress] : in no acute distress [Oriented To Time, Place, And Person] : oriented to person, place, and time [Impaired Insight] : insight and judgment were intact [Affect] : the affect was normal [Person] : oriented to person [Place] : oriented to place [Time] : oriented to time [Short Term Intact] : short term memory intact [Remote Intact] : remote memory intact [Span Intact] : the attention span was normal [Concentration Intact] : normal concentrating ability [Fluency] : fluency intact [Comprehension] : comprehension intact [Current Events] : adequate knowledge of current events [Past History] : adequate knowledge of personal past history [Vocabulary] : adequate range of vocabulary [Cranial Nerves Oculomotor (III)] : extraocular motion intact [Cranial Nerves Trigeminal (V)] : facial sensation intact symmetrically [Cranial Nerves Facial (VII)] : face symmetrical [Cranial Nerves Vestibulocochlear (VIII)] : hearing was intact bilaterally [Cranial Nerves Glossopharyngeal (IX)] : tongue and palate midline [Cranial Nerves Accessory (XI - Cranial And Spinal)] : head turning and shoulder shrug symmetric [Cranial Nerves Hypoglossal (XII)] : there was no tongue deviation with protrusion [Motor Tone] : muscle tone was normal in all four extremities [Motor Strength] : muscle strength was normal in all four extremities [No Muscle Atrophy] : normal bulk in all four extremities [Motor Handedness Right-Handed] : the patient is right hand dominant [Sensation Tactile Decrease] : light touch was intact [Balance] : balance was intact [Extraocular Movements] : extraocular movements were intact [Outer Ear] : the ears and nose were normal in appearance [Neck Appearance] : the appearance of the neck was normal [] : no respiratory distress [Respiration, Rhythm And Depth] : normal respiratory rhythm and effort [Exaggerated Use Of Accessory Muscles For Inspiration] : no accessory muscle use [Heart Rate And Rhythm] : heart rate was normal and rhythm regular [Abnormal Walk] : normal gait [Involuntary Movements] : no involuntary movements were seen [Skin Color & Pigmentation] : normal skin color and pigmentation [Skin Turgor] : normal skin turgor [Limited Balance] : balance was intact [Past-pointing] : there was no past-pointing [Tremor] : no tremor present [FreeTextEntry6] : no drift

## 2023-08-25 NOTE — HISTORY OF PRESENT ILLNESS
[de-identified] : Ms. Soto is a very pleasant 78 year old RIGHT handed female with PMH HTN and HLD of who presents for follow up for SDH after suffering a fall at home on 8/11/2023. She She initially presented to [facility] with c/o elevated blood pressure. She received meds in ED including diuretic and was discharged to home. When she went home, she had LOC and hit head and went back to [place].  CTH showed a mild increase with no focal neurological deficits. CTH was again repeated showing stable SDH. She was transferred to [facility] for further management.  CT 8/14 showed stable small right SDH. CTA BRAIN: 2 to 3 mm outpouching oriented inferiorly from the supraclinoid segment of the left ICA. 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 She was discharged to home on 8/13/2023.  Today, she reports headaches are mild and intermittent. She denies other symptomology including vision, speech, sensori/motor disturbances. She is compliant with bp meds and follows with cardiology.   Plan: FU 4- 6 weeks with non contrast brain ct Consult with Dr. Newman for incidental cerebral aneurysm

## 2023-09-11 ENCOUNTER — APPOINTMENT (OUTPATIENT)
Dept: CARDIOLOGY | Facility: CLINIC | Age: 78
End: 2023-09-11
Payer: MEDICARE

## 2023-09-11 ENCOUNTER — NON-APPOINTMENT (OUTPATIENT)
Age: 78
End: 2023-09-11

## 2023-09-11 VITALS
DIASTOLIC BLOOD PRESSURE: 80 MMHG | WEIGHT: 102 LBS | OXYGEN SATURATION: 97 % | SYSTOLIC BLOOD PRESSURE: 152 MMHG | HEART RATE: 61 BPM | BODY MASS INDEX: 20.56 KG/M2 | HEIGHT: 59 IN

## 2023-09-11 PROCEDURE — 93000 ELECTROCARDIOGRAM COMPLETE: CPT

## 2023-09-11 PROCEDURE — 99214 OFFICE O/P EST MOD 30 MIN: CPT

## 2023-09-11 RX ORDER — NIFEDIPINE 60 MG/1
60 TABLET, EXTENDED RELEASE ORAL
Qty: 180 | Refills: 3 | Status: ACTIVE | COMMUNITY
Start: 1900-01-01 | End: 1900-01-01

## 2023-09-18 ENCOUNTER — OUTPATIENT (OUTPATIENT)
Dept: OUTPATIENT SERVICES | Facility: HOSPITAL | Age: 78
LOS: 1 days | End: 2023-09-18
Payer: MEDICARE

## 2023-09-18 ENCOUNTER — APPOINTMENT (OUTPATIENT)
Dept: CT IMAGING | Facility: CLINIC | Age: 78
End: 2023-09-18
Payer: MEDICARE

## 2023-09-18 DIAGNOSIS — S06.5XAA TRAUMATIC SUBDURAL HEMORRHAGE WITH LOSS OF CONSCIOUSNESS STATUS UNKNOWN, INITIAL ENCOUNTER: ICD-10-CM

## 2023-09-18 PROCEDURE — 70450 CT HEAD/BRAIN W/O DYE: CPT

## 2023-09-18 PROCEDURE — 70450 CT HEAD/BRAIN W/O DYE: CPT | Mod: 26,MH

## 2023-09-29 ENCOUNTER — APPOINTMENT (OUTPATIENT)
Dept: NEUROSURGERY | Facility: CLINIC | Age: 78
End: 2023-09-29
Payer: MEDICARE

## 2023-09-29 VITALS
TEMPERATURE: 97.5 F | SYSTOLIC BLOOD PRESSURE: 149 MMHG | HEIGHT: 59 IN | DIASTOLIC BLOOD PRESSURE: 72 MMHG | HEART RATE: 65 BPM | OXYGEN SATURATION: 97 % | BODY MASS INDEX: 20.56 KG/M2 | WEIGHT: 102 LBS

## 2023-09-29 DIAGNOSIS — I67.1 CEREBRAL ANEURYSM, NONRUPTURED: ICD-10-CM

## 2023-09-29 PROCEDURE — ZZZZZ: CPT

## 2023-09-29 PROCEDURE — 99213 OFFICE O/P EST LOW 20 MIN: CPT

## 2023-11-30 ENCOUNTER — APPOINTMENT (OUTPATIENT)
Dept: CARDIOLOGY | Facility: CLINIC | Age: 78
End: 2023-11-30

## 2024-03-01 ENCOUNTER — RX RENEWAL (OUTPATIENT)
Age: 79
End: 2024-03-01

## 2024-03-01 RX ORDER — FUROSEMIDE 20 MG/1
20 TABLET ORAL DAILY
Qty: 90 | Refills: 1 | Status: ACTIVE | COMMUNITY
Start: 2023-08-29

## 2024-05-29 ENCOUNTER — APPOINTMENT (OUTPATIENT)
Dept: CARDIOLOGY | Facility: CLINIC | Age: 79
End: 2024-05-29
Payer: MEDICARE

## 2024-05-29 ENCOUNTER — NON-APPOINTMENT (OUTPATIENT)
Age: 79
End: 2024-05-29

## 2024-05-29 VITALS
OXYGEN SATURATION: 97 % | BODY MASS INDEX: 20.96 KG/M2 | HEART RATE: 56 BPM | HEIGHT: 59 IN | SYSTOLIC BLOOD PRESSURE: 151 MMHG | WEIGHT: 104 LBS | DIASTOLIC BLOOD PRESSURE: 65 MMHG

## 2024-05-29 DIAGNOSIS — I10 ESSENTIAL (PRIMARY) HYPERTENSION: ICD-10-CM

## 2024-05-29 PROCEDURE — 93000 ELECTROCARDIOGRAM COMPLETE: CPT

## 2024-05-29 PROCEDURE — 99214 OFFICE O/P EST MOD 30 MIN: CPT

## 2024-05-29 PROCEDURE — G2211 COMPLEX E/M VISIT ADD ON: CPT

## 2024-05-29 RX ORDER — LISINOPRIL 40 MG/1
40 TABLET ORAL DAILY
Qty: 90 | Refills: 3 | Status: ACTIVE | COMMUNITY
Start: 1900-01-01 | End: 1900-01-01

## 2024-05-29 RX ORDER — DENOSUMAB 60 MG/ML
INJECTION SUBCUTANEOUS
Refills: 0 | Status: ACTIVE | COMMUNITY

## 2024-05-29 NOTE — CARDIOLOGY SUMMARY
[de-identified] : August 17, 2023 normal sinus rhythm September 11, 2023 normal sinus rhythm May 29, 2024 normal sinus rhythm

## 2024-05-29 NOTE — HISTORY OF PRESENT ILLNESS
[FreeTextEntry1] : Abigail presented to the office today for a follow-up evaluation.  I saw her in the office August 17, 2023 after several hospitalizations.  I saw her in the office in September, 2023.  She is now 78 years old, with a history of hypertension and hypercholesterolemia.  She has a monoclonal gammopathy, followed by hematology.  She was seen in the emergency department on August 11, with uncontrolled hypertension.  Her medication was adjusted, with the addition of chlorthalidone.  She was readmitted the same day having had an episode of syncope, with an unwitnessed fall.  She had a subdural hematoma at that time.  She had no focal deficits.  She was transferred to [facility], where she was observed for several days, and then discharged.  She was reevaluated in the emergency department after discharge with significant hypertension.  She was put on nifedipine at that time.  I saw her in the office August 17, 2023, at which time her blood pressure remained elevated, though extremely labile.  I felt that there was likely a significant component of reactive hypertension, and before making additional adjustments I recommended a 24-hour blood pressure monitor.  This revealed an average daytime blood pressure of 120/67 and an average nighttime blood pressure of 128/78.  This was felt to be overall compatible with a reactive hypertension issue, and she did not have her medications further adjusted at that time.  She presents to the office today having been feeling well.  She reports no chest discomfort or shortness of breath suggestive of angina.  She denies orthopnea, PND and lower extremity edema.  She has not needed furosemide. She denies palpitations, dizziness.  She reports that her blood pressure since her most recent medication adjustment has been labile, but  much better controlled overall.

## 2024-05-29 NOTE — DISCUSSION/SUMMARY
[FreeTextEntry1] : She has had a recent complicated set of hospitalizations, which were reviewed in detail.  She has had a history of hypertension, previously reasonably controlled, with a spike in her blood pressure control recently, for unclear reasons.  Although it was initially unclear to what degree she had reactive hypertension or essential hypertension, and now seems as if she has a very well controlled essential hypertension, with a degree of superimposed reactive hypertension.  I reviewed her echocardiogram from August 2023.  This revealed a normal ejection fraction, with mild to moderate mitral regurgitation.  I reviewed her 24-hour blood pressure monitor from August 2023.  This revealed well-controlled blood pressures.  I will make no changes at this time.  I suggested follow-up in about 6 months. [EKG obtained to assist in diagnosis and management of assessed problem(s)] : EKG obtained to assist in diagnosis and management of assessed problem(s)

## 2024-07-23 NOTE — ED ADULT NURSE NOTE - NS ED NURSE LEVEL OF CONSCIOUSNESS ORIENTATION
Pt to pacu bay 6 from cath lab report received from procedure RN   Oriented - self; Oriented - place; Oriented - time

## 2024-09-05 ENCOUNTER — APPOINTMENT (OUTPATIENT)
Dept: MRI IMAGING | Facility: CLINIC | Age: 79
End: 2024-09-05
Payer: MEDICARE

## 2024-09-05 ENCOUNTER — OUTPATIENT (OUTPATIENT)
Dept: OUTPATIENT SERVICES | Facility: HOSPITAL | Age: 79
LOS: 1 days | End: 2024-09-05
Payer: MEDICARE

## 2024-09-05 DIAGNOSIS — I67.1 CEREBRAL ANEURYSM, NONRUPTURED: ICD-10-CM

## 2024-09-05 PROCEDURE — 70544 MR ANGIOGRAPHY HEAD W/O DYE: CPT

## 2024-09-05 PROCEDURE — 70544 MR ANGIOGRAPHY HEAD W/O DYE: CPT | Mod: 26,MH

## 2024-09-18 ENCOUNTER — NON-APPOINTMENT (OUTPATIENT)
Age: 79
End: 2024-09-18

## 2024-09-19 ENCOUNTER — APPOINTMENT (OUTPATIENT)
Dept: NEUROSURGERY | Facility: CLINIC | Age: 79
End: 2024-09-19
Payer: MEDICARE

## 2024-09-19 VITALS
SYSTOLIC BLOOD PRESSURE: 151 MMHG | WEIGHT: 103 LBS | HEART RATE: 69 BPM | OXYGEN SATURATION: 98 % | BODY MASS INDEX: 21.62 KG/M2 | DIASTOLIC BLOOD PRESSURE: 75 MMHG | HEIGHT: 58 IN | TEMPERATURE: 97.5 F

## 2024-09-19 DIAGNOSIS — I67.1 CEREBRAL ANEURYSM, NONRUPTURED: ICD-10-CM

## 2024-09-19 PROCEDURE — 99215 OFFICE O/P EST HI 40 MIN: CPT

## 2024-09-19 NOTE — END OF VISIT
[FreeTextEntry3] : Ms. Soto underwent follow-up imaging for small left posterior communicating artery aneurysm.  Her imaging appears stable.  She will follow-up in the office in 1 year with a repeat MRI. [Time Spent: ___ minutes] : I have spent [unfilled] minutes of time on the encounter which excludes teaching and separately reported services.

## 2024-09-19 NOTE — HISTORY OF PRESENT ILLNESS
[FreeTextEntry1] : 78F with PMH HTN who presents today for follow up of cerebral aneurysm. She originally presented s/p fall and was found to have small SDH followed by Dr. Herron. Screening imaging showed 2mm L pcomm aneurysm. She denies smoking, family history of aneurysms. She underwent repeat MRA which showed stable 2mm L pcomm aneurysm vs infundublum. She presents today for follow up after imaging. She reports that she feels well, denies headache, weakness, numbness, tingling, dizziness, difficulty walking, difficulty speaking.

## 2024-09-19 NOTE — ASSESSMENT
[FreeTextEntry1] : 78F with incidental finding of 2mm L pcomm aneurysm. Repeat MRA shows stable 2mm aneurysm.   Plan:  - No neurosurgical intervention required at this time  - Repeat MRA in 1 year  - Follow up after imaging performed  - Patient in agreement with plan

## 2024-09-24 ENCOUNTER — NON-APPOINTMENT (OUTPATIENT)
Age: 79
End: 2024-09-24

## 2024-09-24 ENCOUNTER — APPOINTMENT (OUTPATIENT)
Dept: CARDIOLOGY | Facility: CLINIC | Age: 79
End: 2024-09-24
Payer: MEDICARE

## 2024-09-24 VITALS
OXYGEN SATURATION: 94 % | HEIGHT: 58 IN | SYSTOLIC BLOOD PRESSURE: 152 MMHG | DIASTOLIC BLOOD PRESSURE: 77 MMHG | HEART RATE: 61 BPM | BODY MASS INDEX: 21.41 KG/M2 | WEIGHT: 102 LBS

## 2024-09-24 VITALS — DIASTOLIC BLOOD PRESSURE: 70 MMHG | SYSTOLIC BLOOD PRESSURE: 130 MMHG

## 2024-09-24 DIAGNOSIS — I10 ESSENTIAL (PRIMARY) HYPERTENSION: ICD-10-CM

## 2024-09-24 DIAGNOSIS — I34.0 NONRHEUMATIC MITRAL (VALVE) INSUFFICIENCY: ICD-10-CM

## 2024-09-24 DIAGNOSIS — E78.5 HYPERLIPIDEMIA, UNSPECIFIED: ICD-10-CM

## 2024-09-24 PROCEDURE — 93000 ELECTROCARDIOGRAM COMPLETE: CPT

## 2024-09-24 PROCEDURE — 99214 OFFICE O/P EST MOD 30 MIN: CPT

## 2024-09-24 PROCEDURE — G2211 COMPLEX E/M VISIT ADD ON: CPT

## 2024-09-27 NOTE — PHYSICAL EXAM
[Well Developed] : well developed [Well Nourished] : well nourished [No Acute Distress] : no acute distress [Normal Conjunctiva] : normal conjunctiva [Normal Venous Pressure] : normal venous pressure [No Carotid Bruit] : no carotid bruit [Normal S1, S2] : normal S1, S2 [No Murmur] : no murmur [No Rub] : no rub [No Gallop] : no gallop [Clear Lung Fields] : clear lung fields [Good Air Entry] : good air entry [No Respiratory Distress] : no respiratory distress  [Soft] : abdomen soft [Non Tender] : non-tender [No Masses/organomegaly] : no masses/organomegaly [Normal Bowel Sounds] : normal bowel sounds [Normal Gait] : normal gait [No Edema] : no edema [No Cyanosis] : no cyanosis [No Clubbing] : no clubbing [No Varicosities] : no varicosities [No Rash] : no rash [No Skin Lesions] : no skin lesions [Moves all extremities] : moves all extremities [No Focal Deficits] : no focal deficits [Normal Speech] : normal speech [Alert and Oriented] : alert and oriented [Normal memory] : normal memory [Rhythm Regular] : regular [Normal S1] : normal S1 [Normal S2] : normal S2 [No Pitting Edema] : no pitting edema present [No Abnormalities] : the abdominal aorta was not enlarged and no bruit was heard [Right Carotid Bruit] : no bruit heard over the right carotid [Left Carotid Bruit] : no bruit heard over the left carotid

## 2024-09-27 NOTE — ADDENDUM
[FreeTextEntry1] : htn, adm with urgency last y hx of cerebral aneurysm followed by neurosurg routine fu heading to [facility] on cruise on l[place], nifedipine, lasix prn bp fine swims daily echo to reassess mr

## 2024-09-27 NOTE — ADDENDUM
[FreeTextEntry1] : htn, adm with urgency last y hx of cerebral aneurysm followed by neurosurg routine fu heading to [facility] on cruise on [place], nifedipine, lasix prn bp fine swims daily echo to reassess mr

## 2024-09-27 NOTE — CARDIOLOGY SUMMARY
[de-identified] : August 17, 2023 normal sinus rhythm September 11, 2023 normal sinus rhythm May 29, 2024 normal sinus rhythm September 24 2024 sinus rhythm  [de-identified] : August 2023. This revealed a normal ejection fraction, with mild to moderate mitral regurgitation.

## 2024-09-27 NOTE — CARDIOLOGY SUMMARY
[de-identified] : August 17, 2023 normal sinus rhythm September 11, 2023 normal sinus rhythm May 29, 2024 normal sinus rhythm September 24 2024 sinus rhythm  [de-identified] : August 2023. This revealed a normal ejection fraction, with mild to moderate mitral regurgitation.

## 2024-09-27 NOTE — HISTORY OF PRESENT ILLNESS
[FreeTextEntry1] : Abigail presented to the office today for a follow-up evaluation.  I saw her in the office August 17, 2023 after several hospitalizations .  I saw her in the office in May 2024  She is now 78 years old, with a history of hypertension and hypercholesterolemia.  She has a monoclonal gammopathy, followed by hematology.  She was seen in the emergency department on August 11, with uncontrolled hypertension (systolic in 200s).  Her medication was adjusted, with the addition of chlorthalidone.  She was readmitted the same day having had an episode of syncope, with an unwitnessed fall.  She had a subdural hematoma at that time.  She had no focal deficits.  She was transferred to [facility], where she was observed for several days, and then discharged.  She was reevaluated in the emergency department after discharge with significant hypertension.  She was put on nifedipine at that time.  I saw her in the office August 17, 2023, at which time her blood pressure remained elevated, though extremely labile.  I felt that there was likely a significant component of reactive hypertension, and before making additional adjustments I recommended a 24-hour blood pressure monitor.  This revealed an average daytime blood pressure of 120/67 and an average nighttime blood pressure of 128/78.  This was felt to be overall compatible with a reactive hypertension issue, and she did not have her medications further adjusted at that time.  She presents to the office today having been feeling well, she is preparing to go on a Cruise into [place] and will return to [place] for the Winter.  She has had follow up with neurosx for follow up imaging for her cerebral aneurysm. MRA head from 9/2024 demonstrating stable/unchanged infundibulum vs aneurysm (2mm).  She  reports that Repeat imaging will take place next year and no intervention needed.  Her blood pressure has been controlled based on her home B/P measurements, ranging from one teens to 130s.  She swims daily without concerning symptoms.  She reports no chest discomfort or shortness of breath suggestive of angina.  She denies orthopnea, PND and lower extremity edema.  She has not needed furosemide regularly, some times takes it a few times per month.  She denies palpitations, dizziness.

## 2024-09-27 NOTE — PHYSICAL EXAM
[Well Developed] : well developed [Well Nourished] : well nourished [No Acute Distress] : no acute distress [Normal Conjunctiva] : normal conjunctiva [Normal Venous Pressure] : normal venous pressure [No Carotid Bruit] : no carotid bruit [Normal S1, S2] : normal S1, S2 [No Murmur] : no murmur [No Rub] : no rub [No Gallop] : no gallop [Clear Lung Fields] : clear lung fields [Good Air Entry] : good air entry [Soft] : abdomen soft [No Respiratory Distress] : no respiratory distress  [Non Tender] : non-tender [No Masses/organomegaly] : no masses/organomegaly [Normal Bowel Sounds] : normal bowel sounds [Normal Gait] : normal gait [No Edema] : no edema [No Cyanosis] : no cyanosis [No Clubbing] : no clubbing [No Varicosities] : no varicosities [No Rash] : no rash [No Skin Lesions] : no skin lesions [Moves all extremities] : moves all extremities [No Focal Deficits] : no focal deficits [Normal Speech] : normal speech [Alert and Oriented] : alert and oriented [Normal memory] : normal memory [Rhythm Regular] : regular [Normal S1] : normal S1 [Normal S2] : normal S2 [No Pitting Edema] : no pitting edema present [No Abnormalities] : the abdominal aorta was not enlarged and no bruit was heard [Right Carotid Bruit] : no bruit heard over the right carotid [Left Carotid Bruit] : no bruit heard over the left carotid

## 2024-09-27 NOTE — HISTORY OF PRESENT ILLNESS
[FreeTextEntry1] : Abigail presented to the office today for a follow-up evaluation.  I saw her in the office August 17, 2023 after several hospitalizations .  I saw her in the office in May 2024  She is now 78 years old, with a history of hypertension and hypercholesterolemia.  She has a monoclonal gammopathy, followed by hematology.  She was seen in the emergency department on August 11, with uncontrolled hypertension (systolic in 200s).  Her medication was adjusted, with the addition of chlorthalidone.  She was readmitted the same day having had an episode of syncope, with an unwitnessed fall.  She had a subdural hematoma at that time.  She had no focal deficits.  She was transferred to [facility], where she was observed for several days, and then discharged.  She was reevaluated in the emergency department after discharge with significant hypertension.  She was put on nifedipine at that time.  I saw her in the office August 17, 2023, at which time her blood pressure remained elevated, though extremely labile.  I felt that there was likely a significant component of reactive hypertension, and before making additional adjustments I recommended a 24-hour blood pressure monitor.  This revealed an average daytime blood pressure of 120/67 and an average nighttime blood pressure of 128/78.  This was felt to be overall compatible with a reactive hypertension issue, and she did not have her medications further adjusted at that time.  She presents to the office today having been feeling well, she is preparing to go on a Cruise into [place] and will return to [place] for the Winter.  She has had follow up with neurosx for follow up imaging for her cerebral aneurysm. MRA head from 9/2024 demonstrating stable/unchanged infundibulum vs aneurysm (2mm).  She  reports that Repeat imaging will take place next year and no intervention needed.  Her blood pressure has been controlled based on her home B/P measurements, ranging from one teens to 130s.  She swims daily without concerning symptoms.  She reports no chest discomfort or shortness of breath suggestive of angina.  She denies orthopnea, PND and lower extremity edema.  She has not needed furosemide regularly, some times takes it a few times per month.  She denies palpitations, dizziness.

## 2024-09-27 NOTE — DISCUSSION/SUMMARY
[EKG obtained to assist in diagnosis and management of assessed problem(s)] : EKG obtained to assist in diagnosis and management of assessed problem(s) [FreeTextEntry1] : ms Soto arrives for routine follow up and B/P management. She has had a history of hypertension as well as a reactive component. She arrives today feeling well. Euvolemic on exam.  ECG illustrates sinus rhythm.  Her blood pressure improved by the conclusion of the visit.   I reviewed her echocardiogram from August 2023.  This revealed a normal ejection fraction, with mild to moderate mitral regurgitation.  I reviewed her 24-hour blood pressure monitor from August 2023 as well as her B/P measurements from home.  This revealed well-controlled blood pressures.   For precaution,  prior to her next visit, I have advised she undergo an echocardiogram for surveillance to assess for worsening MR.  She will continue current B/P management with lisinopril 40mg and Nifedipine 60mg BID  I will obtain her most recent B/W including lipid panel. She will continue atorvastatin 10mg daily for goal LDL <100. I will make no changes at this time.  I suggested follow-up in about 6 months.

## 2024-10-07 NOTE — ED PROVIDER NOTE - NEUROLOGICAL, MLM
You can access the FollowMyHealth Patient Portal offered by [facility] by registering at the following website: [email or web address]. By joining [facility] FollowMyHealth portal, you will also be able to view your health information using other applications (apps) compatible with our system. Alert and oriented, no focal deficits, no motor or sensory deficits.

## 2025-05-22 ENCOUNTER — NON-APPOINTMENT (OUTPATIENT)
Age: 80
End: 2025-05-22

## 2025-05-22 ENCOUNTER — APPOINTMENT (OUTPATIENT)
Dept: NEUROSURGERY | Facility: CLINIC | Age: 80
End: 2025-05-22
Payer: MEDICARE

## 2025-05-22 VITALS
BODY MASS INDEX: 21.41 KG/M2 | TEMPERATURE: 97.3 F | SYSTOLIC BLOOD PRESSURE: 135 MMHG | HEIGHT: 58 IN | WEIGHT: 102 LBS | OXYGEN SATURATION: 97 % | HEART RATE: 61 BPM | DIASTOLIC BLOOD PRESSURE: 67 MMHG

## 2025-05-22 DIAGNOSIS — I67.1 CEREBRAL ANEURYSM, NONRUPTURED: ICD-10-CM

## 2025-05-22 PROCEDURE — 99214 OFFICE O/P EST MOD 30 MIN: CPT

## 2025-05-22 RX ORDER — ROSUVASTATIN CALCIUM 10 MG/1
10 TABLET, FILM COATED ORAL
Refills: 0 | Status: ACTIVE | COMMUNITY

## 2025-06-06 ENCOUNTER — APPOINTMENT (OUTPATIENT)
Dept: CARDIOLOGY | Facility: CLINIC | Age: 80
End: 2025-06-06
Payer: MEDICARE

## 2025-06-06 ENCOUNTER — NON-APPOINTMENT (OUTPATIENT)
Age: 80
End: 2025-06-06

## 2025-06-06 VITALS
SYSTOLIC BLOOD PRESSURE: 123 MMHG | HEIGHT: 58 IN | DIASTOLIC BLOOD PRESSURE: 69 MMHG | WEIGHT: 102 LBS | BODY MASS INDEX: 21.41 KG/M2

## 2025-06-06 PROCEDURE — 93000 ELECTROCARDIOGRAM COMPLETE: CPT

## 2025-06-06 PROCEDURE — 99214 OFFICE O/P EST MOD 30 MIN: CPT

## 2025-06-06 RX ORDER — ASPIRIN 81 MG/1
81 TABLET, DELAYED RELEASE ORAL
Refills: 0 | Status: ACTIVE | COMMUNITY

## 2025-06-24 ENCOUNTER — APPOINTMENT (OUTPATIENT)
Dept: CARDIOLOGY | Facility: CLINIC | Age: 80
End: 2025-06-24
Payer: MEDICARE

## 2025-06-24 PROCEDURE — 93306 TTE W/DOPPLER COMPLETE: CPT

## 2025-08-04 ENCOUNTER — RX RENEWAL (OUTPATIENT)
Age: 80
End: 2025-08-04